# Patient Record
Sex: FEMALE | Race: WHITE
[De-identification: names, ages, dates, MRNs, and addresses within clinical notes are randomized per-mention and may not be internally consistent; named-entity substitution may affect disease eponyms.]

---

## 2018-04-14 NOTE — EDM.PDOC
ED HPI GENERAL MEDICAL PROBLEM





- General


Chief Complaint: Lower Extremity Injury/Pain


Stated Complaint: RIGHT LEG SWELLING-SENT BY CLINIC


Time Seen by Provider: 04/14/18 11:51


Source of Information: Reports: Patient


History Limitations: Reports: No Limitations





- History of Present Illness


INITIAL COMMENTS - FREE TEXT/NARRATIVE: 


Patient is a 63-year-old female who presents to the ED complaining of right 

lower leg discomfort with mild increased swelling, and mild erythema noted to 

the right superior portion of the medial calf. Pain does radiate to the back of 

the knee. There is slight redness and warmth noted by patient. States symptoms 

came on this morning about 8:00 after taking a shower. There was no discomfort 

noted with getting up from the bed and walking to the bathroom. She has no 

history of DVTs. She denies any recent trauma or activity that may precipitated 

this. Pain is worsened with ambulation and also palpation. She denies any 

history of cancer, hypercoaguable states, recent hospitalization, or surgeries. 

She did travel to [place] the end of February and was there for 10 days. She had 

no issues at that time. She has a history of anxiety and is on Lexapro. She has 

a history of gastric bypass and is also on B12. She is on no estrogen 

replacement therapies.








  ** Right Lower Leg


Pain Score (Numeric/FACES): 4





- Related Data


 Allergies











Allergy/AdvReac Type Severity Reaction Status Date / Time


 


No Known Allergies Allergy   Verified 04/14/18 11:10











Home Meds: 


 Home Meds





Escitalopram [Lexapro] 20 mg PO DAILY 10/27/15 [History]


Cyanocobalamin (Vitamin B-12) [Vitamin B12] 2,500 mcg PO DAILY 07/16/17 [History

]


Ergocalciferol (Vitamin D2) [Vitamin D2] 2,000 units PO DAILY 07/16/17 [History]


Ferrous Sulfate 325 mg PO DAILY 07/16/17 [History]


Omeprazole Magnesium [Prilosec Otc] 20 mg PO DAILY 07/16/17 [History]











Past Medical History


Gastrointestinal History: Reports: Other (See Below)


Genitourinary History: Reports: UTI, Recurrent


Other Musculoskeletal History: lower back pain, lumbar stenosis


Psychiatric History: Reports: Depression


Hematologic History: Reports: B12 Deficiency, Iron Deficiency





- Past Surgical History


GI Surgical History: Reports: Bariatric Procedure


Female  Surgical History: Reports: Hysterectomy





Social & Family History





- Family History


Family Medical History: Noncontributory





- Tobacco Use


Smoking Status *Q: Never Smoker


Second Hand Smoke Exposure: No





- Caffeine Use


Caffeine Use: Reports: Coffee





- Alcohol Use


Days Per Week of Alcohol Use: 0





- Recreational Drug Use


Recreational Drug Use: No





Review of Systems





- Review of Systems


Review Of Systems: See Below


Constitutional: Reports: No Symptoms


Respiratory: Reports: No Symptoms


Cardiovascular: Reports: No Symptoms


GI/Abdominal: Reports: No Symptoms


Neurological: Reports: No Symptoms





ED EXAM, GENERAL





- Physical Exam


Exam: See Below


Exam Limited By: No Limitations


General Appearance: Alert, WD/WN, No Apparent Distress


Ears: Hearing Grossly Normal


Nose: Normal Inspection


Throat/Mouth: Normal Voice, No Airway Compromise


Neck: Normal Inspection, Supple


Respiratory/Chest: No Respiratory Distress, Lungs Clear, Normal Breath Sounds, 

No Accessory Muscle Use


Cardiovascular: Normal Peripheral Pulses, Regular Rate, Rhythm, No Murmur


Peripheral Pulses: 3+: Posterior Tibial (L), Posterior Tibial (R), 4+: Radial (L

), Radial (R)


GI/Abdominal: Normal Bowel Sounds, Soft, Non-Tender, No Organomegaly, No 

Distention


Extremities: Other (Varicosities noted to the right lower leg with small red 

area to the superior aspect of the right calf medially. With palpation 

increaseing pain present.  No sensory deficits noted. Patient has full range of 

motion to the knee with redness streaking up her leg. No open wounds present. )


Neurological: Alert, Oriented, CN II-XII Intact, Normal Cognition, No Motor/

Sensory Deficits





Course





- Vital Signs


Last Recorded V/S: 


 Last Vital Signs











Temp  97.4 F   04/14/18 11:13


 


Pulse  63   04/14/18 11:13


 


Resp  12   04/14/18 11:13


 


BP  151/91 H  04/14/18 11:13


 


Pulse Ox  100   04/14/18 11:13














- Orders/Labs/Meds


Labs: 


 Laboratory Tests











  04/14/18 04/14/18 04/14/18 Range/Units





  12:10 12:10 12:10 


 


WBC  7.96    (3.98-10.04)  K/mm3


 


RBC  4.28    (3.98-5.22)  M/mm3


 


Hgb  12.6    (11.2-15.7)  gm/L


 


Hct  40.3    (34.1-44.9)  %


 


MCV  94.2    (79.4-94.8)  fl


 


MCH  29.4    (25.6-32.2)  pg


 


MCHC  31.3 L    (32.2-35.5)  g/dl


 


RDW Std Deviation  46.6 H    (36.4-46.3)  fL


 


Plt Count  291    (182-369)  K/mm3


 


MPV  10.9    (9.4-12.3)  fl


 


Neutrophils % (Manual)  59    (40-60)  %


 


Band Neutrophils %  0    (0-10)  %


 


Lymphocytes % (Manual)  34    (20-40)  %


 


Atypical Lymphs %  0    %


 


Monocytes % (Manual)  4    (2-10)  %


 


Eosinophils % (Manual)  3    (0.7-5.8)  %


 


Basophils % (Manual)  0 L    (0.1-1.2)  


 


Platelet Estimate  Adequate    


 


RBC Morph Comment  Normal    


 


PT    10.3  (9.5-12.1)  SECONDS


 


INR    0.94  


 


APTT    23 L  (24-31)  SECONDS


 


Sodium   143   (136-145)  mEq/L


 


Potassium   4.1   (3.5-5.1)  mEq/L


 


Chloride   107   ()  mEq/L


 


Carbon Dioxide   28   (21-32)  mEq/L


 


Anion Gap   12.1   (5-15)  


 


BUN   18   (7-18)  mg/dL


 


Creatinine   0.8   (0.55-1.02)  mg/dL


 


Est Cr Clr Drug Dosing   57.99   mL/min


 


Estimated GFR (MDRD)   > 60   (>60)  mL/min


 


BUN/Creatinine Ratio   22.5 H   (14-18)  


 


Glucose   90   ()  mg/dL


 


Calcium   8.7   (8.5-10.1)  mg/dL


 


Total Bilirubin   0.6   (0.2-1.0)  mg/dL


 


AST   21   (15-37)  U/L


 


ALT   25   (14-59)  U/L


 


Alkaline Phosphatase   89   ()  U/L


 


C-Reactive Protein   0.3   (<1.0)  mg/dL


 


Total Protein   6.4   (6.4-8.2)  g/dl


 


Albumin   3.4   (3.4-5.0)  g/dl


 


Globulin   3.0   gm/dL


 


Albumin/Globulin Ratio   1.1   (1-2)  














- Re-Assessments/Exams


Free Text/Narrative Re-Assessment/Exam: 











Ordered CBC, chem 14, CRP, and coag studies. In addition ordered we'll duplex 

lower right extremity venous to evaluate for DVT. Suspect patient has 

superficial thrombophlebitis with history of varicose vein.  Examination would 

be consistent for this as well.  Will rule out DVT.





04/14/18 Ultrasound impression: Superficial thrombophlebitis within the right. 

No findings of deep venous thrombosis seen within the right lower extremity or 

left common femoral vein. Labs reviewed with no concerning findings.





Will discharge patient home with instructions as documented.





The patient remained hemodynamically stable while under my care in the E.D. I 

reviewed the patients care today and discussed the concerning symptoms for 

which to returnto the E.D. with the patient/family. The patient/family 

verbalized understanding. All questions were answered. 








Departure





- Departure


Time of Disposition: 13:58


Disposition: Home, Self-Care 01


Condition: Good


Clinical Impression: 


Thrombophlebitis leg superficial


Qualifiers:


 Laterality: right Qualified Code(s): I80.01 - Phlebitis and thrombophlebitis 

of superficial vessels of right lower extremity








- Discharge Information


Instructions:  Phlebitis, Easy-to-Read


Referrals: 


Sara Covington PA-C [Primary Care Provider] - 


Forms:  ED Department Discharge


Additional Instructions: 


Take ibuprofen 400 mg 4 times a day. Utilize local heat to the affected area 4 

times a day. Will have you start wearing compression stockings for the next 2 

weeks. Please pick these compression stockings up on Monday from [employer]. 

Please followup with PCP the end of next week for reevaluation.  Return to the 

E.D. if you develop any new or worsening symptoms as discussed.

## 2018-04-14 NOTE — US
Right lower extremity deep venous ultrasound: Duplex and color flow 

imaging was obtained of the right common femoral, proximal greater 

saphenous, superficial femoral, popliteal, posterior tibial and 

peroneal veins.  Left common femoral vein was also evaluated.

 

Findings: Superficial vein within the right calf shows evidence of 

thrombus which is compatible with superficial thrombophlebitis.  Deep 

veins show normal phasic flow, augmentation and compression.

 

Impression:

1.  Superficial thrombophlebitis within the right calf.

2.  No findings of deep venous thrombosis seen within the right lower 

extremity or left common femoral vein.

 

Diagnostic code #3

## 2020-09-23 ENCOUNTER — HOSPITAL ENCOUNTER (EMERGENCY)
Dept: HOSPITAL 41 - JD.ED | Age: 65
Discharge: HOME | End: 2020-09-23
Payer: MEDICARE

## 2020-09-23 VITALS — SYSTOLIC BLOOD PRESSURE: 111 MMHG | HEART RATE: 64 BPM | DIASTOLIC BLOOD PRESSURE: 62 MMHG

## 2020-09-23 DIAGNOSIS — F32.9: ICD-10-CM

## 2020-09-23 DIAGNOSIS — Z79.899: ICD-10-CM

## 2020-09-23 DIAGNOSIS — U07.1: Primary | ICD-10-CM

## 2020-09-23 DIAGNOSIS — Z90.710: ICD-10-CM

## 2020-09-23 PROCEDURE — U0002 COVID-19 LAB TEST NON-CDC: HCPCS

## 2020-09-23 PROCEDURE — 84484 ASSAY OF TROPONIN QUANT: CPT

## 2020-09-23 PROCEDURE — 71045 X-RAY EXAM CHEST 1 VIEW: CPT

## 2020-09-23 PROCEDURE — 80053 COMPREHEN METABOLIC PANEL: CPT

## 2020-09-23 PROCEDURE — 99285 EMERGENCY DEPT VISIT HI MDM: CPT

## 2020-09-23 PROCEDURE — 96360 HYDRATION IV INFUSION INIT: CPT

## 2020-09-23 PROCEDURE — 85379 FIBRIN DEGRADATION QUANT: CPT

## 2020-09-23 PROCEDURE — 36415 COLL VENOUS BLD VENIPUNCTURE: CPT

## 2020-09-23 PROCEDURE — 85025 COMPLETE CBC W/AUTO DIFF WBC: CPT

## 2020-09-23 PROCEDURE — 86140 C-REACTIVE PROTEIN: CPT

## 2020-09-23 PROCEDURE — 93005 ELECTROCARDIOGRAM TRACING: CPT

## 2020-09-23 NOTE — CR
Chest: Portable view of the chest was obtained.

 

Comparison: No prior chest imaging is available.

 

Heart size and mediastinum are within normal limits for portable 

technique.  Slight areas of increased density are noted within both 

sides of the chest.  Lungs otherwise are clear.  Bony structures are 

grossly intact.

 

Impression:

1.  Possible minimal areas of increased density on both sides of the 

chest.  Findings could represent mild viral pneumonia.

2.  No additional abnormality is seen on portable chest x-ray.

 

Diagnostic code #3

 

This report was dictated in MDT

## 2020-09-23 NOTE — EDM.PDOC
ED HPI GENERAL MEDICAL PROBLEM





- General


Chief Complaint: Respiratory Problem


Stated Complaint: SOB/WEAK


Time Seen by Provider: 09/23/20 11:07


Source of Information: Reports: Patient


History Limitations: Reports: No Limitations





- History of Present Illness


INITIAL COMMENTS - FREE TEXT/NARRATIVE: 





Patient is a 65-year-old female presenting to the emergency department with a 

one-week history of feeling "under the weather ".  She describes feeling 

intermittently lightheaded, having congestion, clear nasal discharge, and 

postnasal drip, sore throat, and feeling like something is stuck in her throat. 

She states that she was seen at the walk-in clinic earlier this week and told 

that she is likely suffering from allergies, which she has had problems with in 

the past.  She has tried over-the-counter Claritin and Allegra which she states 

did not help much, however she does have some improvement with Flonase spray and

Mucinex.  She states this morning she had an episode where she felt short of 

breath.  This has since passed.  She denies any fever, chills, abdominal pain, 

nausea, vomiting, or diarrhea, however she states she has had a decreased 

appetite and thinks maybe she is dehydrated.


  ** Throat


Pain Score (Numeric/FACES): 4





- Related Data


                                    Allergies











Allergy/AdvReac Type Severity Reaction Status Date / Time


 


No Known Allergies Allergy   Verified 09/23/20 10:40











Home Meds: 


                                    Home Meds





Escitalopram [Lexapro] 20 mg PO DAILY 10/27/15 [History]


Cyanocobalamin (Vitamin B-12) [Vitamin B12] 2,500 mcg PO DAILY 07/16/17 

[History]


Ergocalciferol (Vitamin D2) [Vitamin D2] 2,000 units PO DAILY 07/16/17 [History]


Ferrous Sulfate 325 mg PO DAILY 07/16/17 [History]


Omeprazole Magnesium [Prilosec Otc] 20 mg PO DAILY 07/16/17 [History]


Calcium Carbonate [Calcium] 2,000 mg PO DAILY 09/23/20 [History]











Past Medical History


Gastrointestinal History: Reports: Other (See Below)


Genitourinary History: Reports: UTI, Recurrent


Other Musculoskeletal History: lower back pain, lumbar stenosis


Psychiatric History: Reports: Depression


Hematologic History: Reports: B12 Deficiency, Iron Deficiency





- Infectious Disease History


Infectious Disease History: Reports: Chicken Pox


Other Infectious Disease History: chicken pox as child.





- Past Surgical History


GI Surgical History: Reports: Bariatric Procedure


Female  Surgical History: Reports: Hysterectomy


Musculoskeletal Surgical History: Reports: Hip Replacement


Other Musculoskeletal Surgeries/Procedures:: left hip replacement





Social & Family History





- Family History


Family Medical History: Noncontributory





- Tobacco Use


Smoking Status *Q: Never Smoker





- Caffeine Use


Caffeine Use: Reports: Coffee





- Recreational Drug Use


Recreational Drug Use: No





ED ROS GENERAL





- Review of Systems


Review Of Systems: See Below


Constitutional: Reports: Fatigue, Decreased Appetite.  Denies: Fever, Chills


HEENT: Reports: Rhinitis, Sinus Problem, Throat Pain.  Denies: Vision Change


Respiratory: Reports: Shortness of Breath.  Denies: Cough


Cardiovascular: Reports: Lightheadedness.  Denies: Chest Pain, Dyspnea on 

Exertion


Endocrine: Reports: No Symptoms


GI/Abdominal: Reports: Decreased Appetite.  Denies: Abdominal Pain, Diarrhea, 

Nausea, Vomiting


: Reports: No Symptoms


Musculoskeletal: Reports: No Symptoms


Skin: Reports: No Symptoms


Neurological: Reports: Dizziness.  Denies: Headache, Trouble Speaking, Change in

 Speech, Gait Disturbance


Psychiatric: Reports: No Symptoms


Hematologic/Lymphatic: Reports: No Symptoms


Immunologic: Reports: No Symptoms





ED EXAM, GENERAL





- Physical Exam


Exam: See Below


General Appearance: Alert, WD/WN, No Apparent Distress


Eye Exam: Bilateral Eye: Normal Inspection, PERRL


Ears: Normal External Exam, Normal Canal, Hearing Grossly Normal, Other (Clear 

fluid behind bilateral TMs)


Throat/Mouth: Normal Inspection, Normal Lips, Normal Teeth, Normal Gums, Normal 

Oropharynx, Normal Voice, No Airway Compromise


Neck: Normal Inspection, Supple, Non-Tender, Full Range of Motion


Respiratory/Chest: No Respiratory Distress, Lungs Clear, Normal Breath Sounds, 

No Accessory Muscle Use, Chest Non-Tender


Cardiovascular: Normal Peripheral Pulses, Regular Rate, Rhythm, No Edema, No Gal

lop, No JVD, No Murmur, No Rub


GI/Abdominal: Normal Bowel Sounds, Soft, Non-Tender, No Organomegaly, No 

Distention, No Abnormal Bruit, No Mass


Neurological: Alert, Oriented, CN II-XII Intact, Normal Cognition, Normal Gait, 

Normal Reflexes, No Motor/Sensory Deficits


Psychiatric: Normal Affect, Normal Mood


Skin Exam: Warm, Dry, Intact, Normal Color, No Rash





EKG INTERPRETATION


EKG Date: 09/23/20


Time: 11:03


Rhythm: NSR


Rate (Beats/Min): 55


Axis: Normal ()


P-Wave: Present


QRS: Normal ()


ST-T: Normal


QT: Normal ()





Course





- Vital Signs


Last Recorded V/S: 


                                Last Vital Signs











Temp  98.2 F   09/23/20 10:34


 


Pulse  64   09/23/20 10:34


 


Resp  18   09/23/20 10:34


 


BP  111/62   09/23/20 10:34


 


Pulse Ox  94 L  09/23/20 10:34














- Orders/Labs/Meds


Orders: 


                               Active Orders 24 hr











 Category Date Time Status


 


 CORONAVIRUS COVID-19 PCR [facility] Routine Lab  09/23/20 12:25 Received











Labs: 


                                Laboratory Tests











  09/23/20 09/23/20 09/23/20 Range/Units





  10:48 10:48 10:48 


 


WBC  4.70    (3.98-10.04)  K/mm3


 


RBC  4.51    (3.98-5.22)  M/mm3


 


Hgb  13.4    (11.2-15.7)  gm/dl


 


Hct  42.9    (34.1-44.9)  %


 


MCV  95.1 H    (79.4-94.8)  fl


 


MCH  29.7    (25.6-32.2)  pg


 


MCHC  31.2 L    (32.2-35.5)  g/dl


 


RDW Std Deviation  46.3    (36.4-46.3)  fL


 


Plt Count  248    (182-369)  K/mm3


 


MPV  11.2    (9.4-12.3)  fl


 


Neut % (Auto)  64.8    (34.0-71.1)  %


 


Lymph % (Auto)  22.1    (19.3-51.7)  %


 


Mono % (Auto)  11.9    (4.7-12.5)  %


 


Eos % (Auto)  0.4 L    (0.7-5.8)  


 


Baso % (Auto)  0.4    (0.1-1.2)  %


 


Neut # (Auto)  3.04    (1.56-6.13)  K/mm3


 


Lymph # (Auto)  1.04 L    (1.18-3.74)  K/mm3


 


Mono # (Auto)  0.56 H    (0.24-0.36)  K/mm3


 


Eos # (Auto)  0.02 L    (0.04-0.36)  K/mm3


 


Baso # (Auto)  0.02    (0.01-0.08)  K/mm3


 


D-Dimer, Quantitative   0.42   (0.19-0.50)  mg/L


 


Sodium    139  (136-145)  mEq/L


 


Potassium    3.9  (3.5-5.1)  mEq/L


 


Chloride    102  ()  mEq/L


 


Carbon Dioxide    28  (21-32)  mEq/L


 


Anion Gap    12.9  (5-15)  


 


BUN    15  (7-18)  mg/dL


 


Creatinine    1.0  (0.55-1.02)  mg/dL


 


Est Cr Clr Drug Dosing    45.16  mL/min


 


Estimated GFR (MDRD)    55  (>60)  mL/min


 


BUN/Creatinine Ratio    15.0  (14-18)  


 


Glucose    76 L  ()  mg/dL


 


Calcium    8.4 L  (8.5-10.1)  mg/dL


 


Total Bilirubin    0.5  (0.2-1.0)  mg/dL


 


AST    33  (15-37)  U/L


 


ALT    27  (14-59)  U/L


 


Alkaline Phosphatase    66  ()  U/L


 


Troponin I    < 0.017  (0.00-0.056)  ng/mL


 


C-Reactive Protein    3.4 H*  (<1.0)  mg/dL


 


Total Protein    6.9  (6.4-8.2)  g/dl


 


Albumin    3.4  (3.4-5.0)  g/dl


 


Globulin    3.5  gm/dL


 


Albumin/Globulin Ratio    1.0  (1-2)  











Meds: 


Medications














Discontinued Medications














Generic Name Dose Route Start Last Admin





  Trade Name Freq  PRN Reason Stop Dose Admin


 


Sodium Chloride  1,000 mls @ 999 mls/hr  09/23/20 11:49  09/23/20 12:17





  Normal Saline  IV  09/23/20 12:49  999 mls/hr





  NOW STA   Administration














- Re-Assessments/Exams


Free Text/Narrative Re-Assessment/Exam: 


Patient is a 65-year-old female presenting to the emergency department with 

feeling under the weather.  She is had intermittent lightheadedness, increased 

fatigue, nasal congestion with postnasal drip, sore throat, and feeling like 

something is stuck in her throat ".  Today she had a brief episode of shortness 

of breath, however this has resolved.  My suspicion is that she is likely 

suffering from seasonal allergies, however I have ordered a CBC, CMP, CRP, d-

dimer, troponin, EKG, chest x-ray.  We will give her a 1 L bolus of normal 

saline IV.





09/23/20 13:07


Otology was grossly unremarkable.  D-dimer was negative, electrolytes were 

normal, anion gap was normal, troponin was negative, CRP minimally elevated at 

3.4.  There were no obvious visible infiltrates on chest x-ray, however official

 radiologist read is still pending.  Oxygen saturations have ranged from 93 to 

96% throughout her stay in the ER.  We will complete a state send out 

coronavirus test.  Recommend that she continue to use Flonase and Mucinex as 

needed.  Discharge instructions as documented.








Departure





- Departure


Time of Disposition: 13:18


Disposition: Home, Self-Care 01


Condition: Good


Clinical Impression: 


 Nasal congestion, Shortness of breath





Fatigue


Qualifiers:


 Fatigue type: unspecified Qualified Code(s): R53.83 - Other fatigue








- Discharge Information


*PRESCRIPTION DRUG MONITORING PROGRAM REVIEWED*: No


*COPY OF PRESCRIPTION DRUG MONITORING REPORT IN PATIENT GONZALO: No


Instructions:  Fatigue


Referrals: 


Sara Covington PA-C [Primary Care Provider] - 


Forms:  ED Department Discharge


Additional Instructions: 


You were seen in the emergency department today for intermittent episodes of 

lightheadedness, nasal congestion with postnasal drip, sore throat, shortness of

breath, and feeling of something stuck in your throat.  He work-up included 

blood work, chest x-ray, and EKG of your heart.  Your work-up was found to be 

overall normal.  As we discussed, it is very possible that your symptoms are 

caused by seasonal allergies, however you have also been tested for COVID today.

 These results generally take 24 to 72 hours.  You will be notified when these 

are complete.  Recommend that you continue to use your over-the-counter Flonase 

and Mucinex as needed.  Ensure that you are taking an adequate amount of fluid. 

Recommend isolation until your COVID results are available.  Return to the ER 

for any new or worsening symptoms of concern.





Sepsis Event Note (ED)





- Evaluation


Sepsis Screening Result: No Definite Risk





- My Orders


Last 24 Hours: 


My Active Orders





09/23/20 12:25


CORONAVIRUS COVID-19 PCR [facility] Routine 














- Assessment/Plan


Last 24 Hours: 


My Active Orders





09/23/20 12:25


CORONAVIRUS COVID-19 PCR [facility] Routine

## 2020-09-26 ENCOUNTER — HOSPITAL ENCOUNTER (EMERGENCY)
Dept: HOSPITAL 41 - JD.ED | Age: 65
Discharge: HOME | End: 2020-09-26
Payer: MEDICARE

## 2020-09-26 VITALS — SYSTOLIC BLOOD PRESSURE: 105 MMHG | DIASTOLIC BLOOD PRESSURE: 50 MMHG | HEART RATE: 53 BPM

## 2020-09-26 DIAGNOSIS — J12.89: ICD-10-CM

## 2020-09-26 DIAGNOSIS — F32.9: ICD-10-CM

## 2020-09-26 DIAGNOSIS — K21.9: ICD-10-CM

## 2020-09-26 DIAGNOSIS — U07.1: Primary | ICD-10-CM

## 2020-09-26 DIAGNOSIS — Z90.710: ICD-10-CM

## 2020-09-26 DIAGNOSIS — Z79.899: ICD-10-CM

## 2020-09-26 PROCEDURE — 85379 FIBRIN DEGRADATION QUANT: CPT

## 2020-09-26 PROCEDURE — 96374 THER/PROPH/DIAG INJ IV PUSH: CPT

## 2020-09-26 PROCEDURE — 83605 ASSAY OF LACTIC ACID: CPT

## 2020-09-26 PROCEDURE — 83880 ASSAY OF NATRIURETIC PEPTIDE: CPT

## 2020-09-26 PROCEDURE — 71275 CT ANGIOGRAPHY CHEST: CPT

## 2020-09-26 PROCEDURE — 83735 ASSAY OF MAGNESIUM: CPT

## 2020-09-26 PROCEDURE — 85610 PROTHROMBIN TIME: CPT

## 2020-09-26 PROCEDURE — 84484 ASSAY OF TROPONIN QUANT: CPT

## 2020-09-26 PROCEDURE — 96361 HYDRATE IV INFUSION ADD-ON: CPT

## 2020-09-26 PROCEDURE — 85730 THROMBOPLASTIN TIME PARTIAL: CPT

## 2020-09-26 PROCEDURE — 82553 CREATINE MB FRACTION: CPT

## 2020-09-26 PROCEDURE — 80053 COMPREHEN METABOLIC PANEL: CPT

## 2020-09-26 PROCEDURE — 86140 C-REACTIVE PROTEIN: CPT

## 2020-09-26 PROCEDURE — 36415 COLL VENOUS BLD VENIPUNCTURE: CPT

## 2020-09-26 PROCEDURE — 83615 LACTATE (LD) (LDH) ENZYME: CPT

## 2020-09-26 PROCEDURE — 99285 EMERGENCY DEPT VISIT HI MDM: CPT

## 2020-09-26 PROCEDURE — 82728 ASSAY OF FERRITIN: CPT

## 2020-09-26 PROCEDURE — 71045 X-RAY EXAM CHEST 1 VIEW: CPT

## 2020-09-26 PROCEDURE — 85025 COMPLETE CBC W/AUTO DIFF WBC: CPT

## 2020-09-26 PROCEDURE — 93005 ELECTROCARDIOGRAM TRACING: CPT

## 2020-09-26 NOTE — CR
Chest: Portable view of the chest was obtained.

 

Comparison: Prior chest x-ray of 09/23/20.

 

Increased diffuse parenchymal change is noted within both lungs as an 

interval change from previous study.  Findings are compatible with 

diffuse Covid pneumonia.

 

Heart size and mediastinum are normal.  Bony structures are grossly 

intact.

 

Impression:

1.  Worsening appearance of the chest from previous exam which is 

compatible with worsening Covid pneumonia.

2.  Other portions of the chest are stable.

 

Diagnostic code #5

 

This report was dictated in MDT

## 2020-09-26 NOTE — EDM.PDOC
ED HPI GENERAL MEDICAL PROBLEM





- General


Chief Complaint: Chest Pain


Stated Complaint: COVID +/SOB


Time Seen by Provider: 09/26/20 09:00


Source of Information: Reports: Patient


History Limitations: Reports: No Limitations





- History of Present Illness


INITIAL COMMENTS - FREE TEXT/NARRATIVE: 





65-year-old female presents to the ED feeling lightheaded dizzy and short of 

breath.  She has been under the weather for 8 days starting last Saturday, September 19.  Paroxysmal cough minimally productive of white sputum.  She was 

seen through the ED on the 23rd of this month and COVID screen was carried out. 

She returned COVID positive from [facility] yesterday.  Decreased appetite 

for the last 3 days.  Had 2 loose stools yesterday.  No nausea or vomiting.  

Generalized weakness generalized myalgia.  Fever and  chills intermittently.  

Does feel heaviness central chest and difficulty to get a full breath of air.


Onset: Gradual


Onset Date: 09/19/20


Duration: Day(s):, Getting Worse


Location: Reports: Chest (Pressure in the upper chest and difficult to get a 

full deep breath of air.  Cough occasionally productive of white sputum.), 

Generalized (Generalized weakness.  Generalized myalgia.), Other (Intermittent 

fever chills.  Marked anorexia.)


Quality: Reports: Ache


Severity: Moderate (Generalized myalgia)


Improves with: Reports: Medication


Worsens with: Reports: Movement


Context: Reports: Other (Diagnosed with COVID-19 illness yesterday through 

public health.).  Denies: Activity, Exercise (Has been mostly bedridden the last

3 days.), Lifting, Sick Contact, Trauma


Associated Symptoms: Reports: Chest Pain (Heaviness), Cough ( mostly upper 

anterior chest.), cough w sputum, Fever/Chills, Headaches, Loss of Appetite, 

Malaise, Shortness of Breath, Other (Mild diarrhea yesterday.).  Denies: 

Confusion, Diaphoresis (Occasional white sputum.), Nausea/Vomiting


Treatments PTA: Reports: Acetaminophen


  ** Chest


Pain Score (Numeric/FACES): 6





- Related Data


                                    Allergies











Allergy/AdvReac Type Severity Reaction Status Date / Time


 


No Known Allergies Allergy   Verified 09/26/20 08:57











Home Meds: 


                                    Home Meds





Escitalopram [Lexapro] 20 mg PO DAILY 10/27/15 [History]


Cyanocobalamin (Vitamin B-12) [Vitamin B12] 2,500 mcg PO DAILY 07/16/17 

[History]


Ergocalciferol (Vitamin D2) [Vitamin D2] 2,000 units PO DAILY 07/16/17 [History]


Ferrous Sulfate 325 mg PO DAILY 07/16/17 [History]


Omeprazole Magnesium [Prilosec Otc] 20 mg PO DAILY 07/16/17 [History]


Calcium Carbonate [Calcium] 2,000 mg PO DAILY 09/23/20 [History]


dexAMETHasone [Dexamethasone] 8 mg PO BID #10 tablet 09/26/20 [Rx]











Past Medical History


Gastrointestinal History: Reports: GERD, Hiatal Hernia (Hiatal hernia post 

gastric bypass surgery.), Other (See Below)


Genitourinary History: Reports: UTI, Recurrent


Other Musculoskeletal History: lower back pain, lumbar stenosis


Psychiatric History: Reports: Depression


Hematologic History: Reports: B12 Deficiency, Iron Deficiency





- Infectious Disease History


Infectious Disease History: Reports: Chicken Pox


Other Infectious Disease History: chicken pox as child.





- Past Surgical History


GI Surgical History: Reports: Bariatric Procedure (Gastric bypass.)


Female  Surgical History: Reports: Hysterectomy


Musculoskeletal Surgical History: Reports: Hip Replacement


Other Musculoskeletal Surgeries/Procedures:: left hip replacement





Social & Family History





- Family History


Family Medical History: Noncontributory





- Caffeine Use


Caffeine Use: Reports: Coffee





- Living Situation & Occupation


Living situation: Reports: 


Occupation: Retired





ED ROS GENERAL





- Review of Systems


Review Of Systems: See Below


Constitutional: Reports: Fever, Chills, Malaise, Weakness, Fatigue, Decreased 

Appetite


HEENT: Reports: Other


Respiratory: Reports: Shortness of Breath, Cough, Sputum.  Denies: Wheezing, 

Pleuritic Chest Pain, Hemoptysis (Occasional white sputum production)


Cardiovascular: Reports: Chest Pain (Upper chest discomfort.), Blood Pressure 

Problem, Dyspnea on Exertion, Lightheadedness (Dizziness with standing.).  

Denies: Claudication (Running a low at this time.), Edema, Orthopnea, 

Palpitations


Endocrine: Reports: Fatigue


GI/Abdominal: Reports: Anorexia (Still taking some water and Gatorade.  No 

appetite at all), Diarrhea (Had 3 loose bowel movements yesterday but none 

since.)


: Reports: No Symptoms


Musculoskeletal: Reports: Muscle Pain (Generalized myalgia.)


Skin: Reports: No Symptoms


Neurological: Reports: Dizziness, Headache, Difficulty Walking (Due to the 

weakness.), Weakness.  Denies: Confusion, Numbness, Syncope, Tingling


Psychiatric: Reports: No Symptoms


Hematologic/Lymphatic: Reports: No Symptoms


Immunologic: Reports: No Symptoms





ED EXAM, GENERAL





- Physical Exam


Exam: See Below


Exam Limited By: No Limitations


General Appearance: Alert, WD/WN, No Apparent Distress, Other (Temperature is 

36.6.  Heart rate is 53 and sinus bradycardia on the monitor respiratory was 12-

16 with O2 sats 93 to 95% room air /50.  Initially.  After that it came 

down to 93/47.)


Eye Exam: Bilateral Eye: Normal Inspection (No scleral icterus or blepharal 

pallor.), PERRL


Ears: Normal TMs


Throat/Mouth: Normal Inspection, Normal Oropharynx (Tongue is mildly dry and 

coated.), Other


Head: Atraumatic, Normocephalic


Neck: Normal Inspection, Supple, Non-Tender, Full Range of Motion.  No: Carotid 

Bruit, Lymphadenopathy (L), Lymphadenopathy (R)


Respiratory/Chest: No Respiratory Distress, No Accessory Muscle Use, Crackles.  

No: Respiratory Distress, Rales, Rhonchi, Wheezing


Cardiovascular: Normal Peripheral Pulses, Regular Rate, Rhythm, No Edema, No 

Gallop, No Murmur


Peripheral Pulses: 2+: Posterior Tibial (L), Posterior Tibial (R), Dorsalis 

Pedis (L), Dorsalis Pedis (R), 3+: Carotid (L), Carotid (R)


GI/Abdominal: Normal Bowel Sounds, Soft, Non-Tender, No Organomegaly, No Mass, 

Pelvis Stable, Other (Previous surgical scars from bariatric surgery and 

hysterectomy.)


Back Exam: Normal Inspection, Full Range of Motion.  No: CVA Tenderness (L), CVA

 Tenderness (R)


Extremities: Normal Inspection, Normal Range of Motion, Non-Tender, No Pedal 

Edema


Neurological: Alert, Oriented, CN II-XII Intact, Normal Cognition


Psychiatric: Normal Affect, Normal Mood


Skin Exam: Warm, Dry, Intact, Normal Color, No Rash





EKG INTERPRETATION


EKG Date: 09/26/20


Time: 09:25


Rhythm: Other


Rate (Beats/Min): 54


Axis: Normal


P-Wave: Enlarged (Left atrial hypertrophy pattern)


QRS: Normal


ST-T: Normal


QT: Prolonged (Mildly prolonged)


EKG Interpretation Comments: 





Borderline ECG





Course





- Vital Signs


Last Recorded V/S: 


                                Last Vital Signs











Temp  36.6 C   09/26/20 08:45


 


Pulse  53 L  09/26/20 08:45


 


Resp  12   09/26/20 08:45


 


BP  105/50 L  09/26/20 08:45


 


Pulse Ox  95   09/26/20 08:45














- Orders/Labs/Meds


Labs: 


                                Laboratory Tests











  09/26/20 09/26/20 09/26/20 Range/Units





  09:45 09:45 09:45 


 


WBC  6.34    (3.98-10.04)  K/mm3


 


RBC  4.54    (3.98-5.22)  M/mm3


 


Hgb  13.6    (11.2-15.7)  gm/dl


 


Hct  42.6    (34.1-44.9)  %


 


MCV  93.8    (79.4-94.8)  fl


 


MCH  30.0    (25.6-32.2)  pg


 


MCHC  31.9 L    (32.2-35.5)  g/dl


 


RDW Std Deviation  44.6    (36.4-46.3)  fL


 


Plt Count  313    (182-369)  K/mm3


 


MPV  11.0    (9.4-12.3)  fl


 


Neut % (Auto)  80.9 H    (34.0-71.1)  %


 


Lymph % (Auto)  9.9 L    (19.3-51.7)  %


 


Mono % (Auto)  7.1    (4.7-12.5)  %


 


Eos % (Auto)  1.4    (0.7-5.8)  


 


Baso % (Auto)  0.5    (0.1-1.2)  %


 


Neut # (Auto)  5.13    (1.56-6.13)  K/mm3


 


Lymph # (Auto)  0.63 L    (1.18-3.74)  K/mm3


 


Mono # (Auto)  0.45 H    (0.24-0.36)  K/mm3


 


Eos # (Auto)  0.09    (0.04-0.36)  K/mm3


 


Baso # (Auto)  0.03    (0.01-0.08)  K/mm3


 


Manual Slide Review  Abnormal smear    


 


PT   10.8   (9.7-11.7)  SECONDS


 


INR   1.01   


 


APTT   27   (22-31)  SECONDS


 


D-Dimer, Quantitative     (0.19-0.50)  mg/L


 


Sodium    137  (136-145)  mEq/L


 


Potassium    3.9  (3.5-5.1)  mEq/L


 


Chloride    99  ()  mEq/L


 


Carbon Dioxide    28  (21-32)  mEq/L


 


Anion Gap    13.9  (5-15)  


 


BUN    14  (7-18)  mg/dL


 


Creatinine    0.8  (0.55-1.02)  mg/dL


 


Est Cr Clr Drug Dosing    56.45  mL/min


 


Estimated GFR (MDRD)    > 60  (>60)  mL/min


 


BUN/Creatinine Ratio    17.5  (14-18)  


 


Glucose    98  ()  mg/dL


 


Lactic Acid     (0.4-2.0)  mmol/L


 


Calcium    8.4 L  (8.5-10.1)  mg/dL


 


Magnesium    2.2  (1.8-2.4)  mg/dl


 


Ferritin     (8-252)  ng/ml


 


Total Bilirubin    0.7  (0.2-1.0)  mg/dL


 


AST    35  (15-37)  U/L


 


ALT    28  (14-59)  U/L


 


Alkaline Phosphatase    68  ()  U/L


 


Lactate Dehydrogenase    325 H  ()  U/L


 


CK-MB (CK-2)    1.1  (0-3.6)  ng/ml


 


Troponin I    < 0.017  (0.00-0.056)  ng/mL


 


C-Reactive Protein    10.2 H*  (<1.0)  mg/dL


 


NT-Pro-B Natriuret Pep     (0-125)  pg/mL


 


Total Protein    6.9  (6.4-8.2)  g/dl


 


Albumin    3.1 L  (3.4-5.0)  g/dl


 


Globulin    3.8  gm/dL


 


Albumin/Globulin Ratio    0.8 L  (1-2)  














  09/26/20 09/26/20 09/26/20 Range/Units





  09:45 09:45 09:45 


 


WBC     (3.98-10.04)  K/mm3


 


RBC     (3.98-5.22)  M/mm3


 


Hgb     (11.2-15.7)  gm/dl


 


Hct     (34.1-44.9)  %


 


MCV     (79.4-94.8)  fl


 


MCH     (25.6-32.2)  pg


 


MCHC     (32.2-35.5)  g/dl


 


RDW Std Deviation     (36.4-46.3)  fL


 


Plt Count     (182-369)  K/mm3


 


MPV     (9.4-12.3)  fl


 


Neut % (Auto)     (34.0-71.1)  %


 


Lymph % (Auto)     (19.3-51.7)  %


 


Mono % (Auto)     (4.7-12.5)  %


 


Eos % (Auto)     (0.7-5.8)  


 


Baso % (Auto)     (0.1-1.2)  %


 


Neut # (Auto)     (1.56-6.13)  K/mm3


 


Lymph # (Auto)     (1.18-3.74)  K/mm3


 


Mono # (Auto)     (0.24-0.36)  K/mm3


 


Eos # (Auto)     (0.04-0.36)  K/mm3


 


Baso # (Auto)     (0.01-0.08)  K/mm3


 


Manual Slide Review     


 


PT     (9.7-11.7)  SECONDS


 


INR     


 


APTT     (22-31)  SECONDS


 


D-Dimer, Quantitative    0.76 H  (0.19-0.50)  mg/L


 


Sodium     (136-145)  mEq/L


 


Potassium     (3.5-5.1)  mEq/L


 


Chloride     ()  mEq/L


 


Carbon Dioxide     (21-32)  mEq/L


 


Anion Gap     (5-15)  


 


BUN     (7-18)  mg/dL


 


Creatinine     (0.55-1.02)  mg/dL


 


Est Cr Clr Drug Dosing     mL/min


 


Estimated GFR (MDRD)     (>60)  mL/min


 


BUN/Creatinine Ratio     (14-18)  


 


Glucose     ()  mg/dL


 


Lactic Acid     (0.4-2.0)  mmol/L


 


Calcium     (8.5-10.1)  mg/dL


 


Magnesium     (1.8-2.4)  mg/dl


 


Ferritin   1495 H   (8-252)  ng/ml


 


Total Bilirubin     (0.2-1.0)  mg/dL


 


AST     (15-37)  U/L


 


ALT     (14-59)  U/L


 


Alkaline Phosphatase     ()  U/L


 


Lactate Dehydrogenase     ()  U/L


 


CK-MB (CK-2)     (0-3.6)  ng/ml


 


Troponin I     (0.00-0.056)  ng/mL


 


C-Reactive Protein     (<1.0)  mg/dL


 


NT-Pro-B Natriuret Pep  513 H    (0-125)  pg/mL


 


Total Protein     (6.4-8.2)  g/dl


 


Albumin     (3.4-5.0)  g/dl


 


Globulin     gm/dL


 


Albumin/Globulin Ratio     (1-2)  














  09/26/20 Range/Units





  09:45 


 


WBC   (3.98-10.04)  K/mm3


 


RBC   (3.98-5.22)  M/mm3


 


Hgb   (11.2-15.7)  gm/dl


 


Hct   (34.1-44.9)  %


 


MCV   (79.4-94.8)  fl


 


MCH   (25.6-32.2)  pg


 


MCHC   (32.2-35.5)  g/dl


 


RDW Std Deviation   (36.4-46.3)  fL


 


Plt Count   (182-369)  K/mm3


 


MPV   (9.4-12.3)  fl


 


Neut % (Auto)   (34.0-71.1)  %


 


Lymph % (Auto)   (19.3-51.7)  %


 


Mono % (Auto)   (4.7-12.5)  %


 


Eos % (Auto)   (0.7-5.8)  


 


Baso % (Auto)   (0.1-1.2)  %


 


Neut # (Auto)   (1.56-6.13)  K/mm3


 


Lymph # (Auto)   (1.18-3.74)  K/mm3


 


Mono # (Auto)   (0.24-0.36)  K/mm3


 


Eos # (Auto)   (0.04-0.36)  K/mm3


 


Baso # (Auto)   (0.01-0.08)  K/mm3


 


Manual Slide Review   


 


PT   (9.7-11.7)  SECONDS


 


INR   


 


APTT   (22-31)  SECONDS


 


D-Dimer, Quantitative   (0.19-0.50)  mg/L


 


Sodium   (136-145)  mEq/L


 


Potassium   (3.5-5.1)  mEq/L


 


Chloride   ()  mEq/L


 


Carbon Dioxide   (21-32)  mEq/L


 


Anion Gap   (5-15)  


 


BUN   (7-18)  mg/dL


 


Creatinine   (0.55-1.02)  mg/dL


 


Est Cr Clr Drug Dosing   mL/min


 


Estimated GFR (MDRD)   (>60)  mL/min


 


BUN/Creatinine Ratio   (14-18)  


 


Glucose   ()  mg/dL


 


Lactic Acid  1.2  (0.4-2.0)  mmol/L


 


Calcium   (8.5-10.1)  mg/dL


 


Magnesium   (1.8-2.4)  mg/dl


 


Ferritin   (8-252)  ng/ml


 


Total Bilirubin   (0.2-1.0)  mg/dL


 


AST   (15-37)  U/L


 


ALT   (14-59)  U/L


 


Alkaline Phosphatase   ()  U/L


 


Lactate Dehydrogenase   ()  U/L


 


CK-MB (CK-2)   (0-3.6)  ng/ml


 


Troponin I   (0.00-0.056)  ng/mL


 


C-Reactive Protein   (<1.0)  mg/dL


 


NT-Pro-B Natriuret Pep   (0-125)  pg/mL


 


Total Protein   (6.4-8.2)  g/dl


 


Albumin   (3.4-5.0)  g/dl


 


Globulin   gm/dL


 


Albumin/Globulin Ratio   (1-2)  











Meds: 


Medications














Discontinued Medications














Generic Name Dose Route Start Last Admin





  Trade Name Freq  PRN Reason Stop Dose Admin


 


Dexamethasone  4 mg  09/26/20 13:40  09/26/20 14:11





  Dexamethasone  IVPUSH  09/26/20 13:41  4 mg





  ONETIME ONE   Administration


 


Dextrose/Lactated Ringer's  1,000 mls @ 999 mls/hr  09/26/20 09:15  09/26/20 

09:48





  Dextrose 5%-Lactated Ringers  IV   999 mls/hr





  ASDIRECTED VIRGIL   Administration


 


Sodium Chloride  100 mls @ 75 mls/hr  09/26/20 12:15  09/26/20 12:38





  Normal Saline  IV   75 mls/hr





  ASDIRECTED VIRGIL   Administration


 


Iopamidol  100 ml  09/26/20 12:01  09/26/20 12:36





  Isovue-370 (76%)  IVPUSH  09/26/20 12:02  100 ml





  ONETIME ONE   Administration


 


Sodium Chloride  10 ml  09/26/20 12:01  09/26/20 12:36





  Saline Flush  FLUSH   10 ml





  ONETIME PRN   Administration





  IV FLUSH  














- Radiology Interpretation


Free Text/Narrative:: 


65-year-old female presents to the ED with 8 days of illness.  Intermittent 

fever chills.  Generalized myalgia.  Loss of appetite.  She believes she is lost

 her sense of taste.  Increased shortness of breath over the last few days.  

Diagnosed with COVID positive illness yesterday through public health system.  

She was seen through the ED on 23 September and COVID screening was carried out 

at that time.  On examination blood pressure is low at 93/47.  She is not 

tachypneic and lungs for the most part are clear with a few crackles in both 

bases.  Chest x-ray reviewed from 3 days ago revealed perhaps a very early base 

basilar infiltrates bilaterally with suspicion for early viral pneumonia.  At 

this time patient appears to be mainly volume depleted.  IV will be D5 LR at 

open.  Not febrile at this time.  Complaining of some upper chest discomfort and

 difficulty getting her breath.  O2 sats are 93 to 95% at rest.  Plan she will 

have routine labs performed plus serum ferritin, LDH and troponin and d-dimer 

assays.








- Re-Assessments/Exams


Free Text/Narrative Re-Assessment/Exam: 





09/26/20 10:25 chest x-ray done portable reveals diffuse infiltrates in all 

lobes of the lungs compatible with viral pneumonia.  Cardiac silhouette is 

normal.  Bony structures are intact.  02 sats are staying 95 to 98% on room air.





09/26/20 10:26 White count is 6.34 with 80.9% neutrophils.  Hemoglobin 13.6 with

 hematocrit of 42.6.  Platelet count 313,000





09/26/20 11:03  Lactic acid 1.2.





09/26/20 11:20 PT is 10.8 with an INR of 1.01.  PTT is 27.  D-dimer is mildly 

elevated at 0.76.  Sodium 137 with potassium 3.9 chloride 99 with a bicarb of 

28.  Anion gap is 13.9.  BUN is 14 with a creatinine of 0.8.  GFR is greater 

than 60.  Glucose is 98.  Calcium slightly low at 8.4 due to not eating.  

Magnesium 2.2.  Serum ferritin markedly elevated at 1495.  Liver function 

normal.  Lactic dehydrogenase is elevated at 325.  CK-MB fraction 1.1 with a 

troponin I of less than 0.017.  C-reactive protein elevated at 10.2 BNP is 

elevated at 513.  Protein is 6.9 with an albumin fraction of 3.1.  Due to the 

elevated BNP I am going to give her Lasix 40 mg IV.  This should improve her 

hypoxemia which is not that bad on average she has been 95% on room air.  I will

 proceed with a CT pulmonary angiogram however due to the elevated d-dimer and 

BNP.  Unlikely to have PE since the troponin is normal.  A PE  should cause  

significant stress on the right side of the heart and elevation of cardiac 

markers.





09/26/20 11:41 Review of the patient's blood pressure remains on the lower side 

at 86/90 in spite of a liter of IV fluids.  I am going to proceed with further 

fluids D5 LR at 150 mils per hour.  Because of the low blood pressure elevated 

d-dimer and elevated BNP I am going to proceed with CT pulmonary angiogram.  

Patient has been advised of the reasoning for this.





09/26/20 13:20 Pulmonary angiogram reveals pulmonary arteries to be well 

opacified.  No filling defects are seen to indicate pulmonary embolism.  Aorta 

shows no aneurysm.  Scattered mediastinal and right hilar lymph nodes are seen. 

 Right hilar lymph nodes are slightly prominent most likely reactive to current 

illness.  No pericardial effusions are seen.  Previous bariatric surgery is 

appreciated small gallstones are seen within the gallbladder.  There is 

extensive patchy areas of increased density throughout both lungs involving 

upper and lower lobes.  This correlates with chest x-ray and COVID-19 illness.  

No pleural effusions are seen.  Bony structures show nothing acute.  Scattered 

degenerative changes are noted within the thoracic spine.





09/26/20 13:40 She seems to be doing better.  Sats are staying around 95% on 

room air.  Blood pressure is now 95/50.  Plan will be to give her a dose of 

steroids-- dexamethasone 4 mg IV push.  I will then discharge her to home.  Her 

 is not feeling well either and likely has COVID illness by history.   

She has no idea where she picked up this illness as she is been very cautious 

out in public, wearing mask etc.  I am going to place her on dexamethasone 8 mg 

twice daily for the next 3 days to help reduce inflammation.  She will of course

 return if she could becomes more short of breath or dyspneic.  Continue Tylenol

 as needed for fever relief.  Diet as able.

















Departure





- Departure


Time of Disposition: 14:15


Disposition: Home, Self-Care 01


Reason for Transfer *Q: Other


Condition: Fair


Clinical Impression: 


 COVID-19 determined by clinical diagnostic criteria, Viral pneumonia





Prescriptions: 


dexAMETHasone [Dexamethasone] 8 mg PO BID #10 tablet


Instructions:  COVID-19 Frequently Asked Questions, Viral Respiratory Infection,

 Easy-To-Read, Prevent the Spread of COVID-19 if You Are Sick - [facility]


Referrals: 


Sara Covington PA-C [Primary Care Provider] - 


Forms:  ED Department Discharge


Additional Instructions: 


Evaluation in the emergency room today in regards to COVID-19 illness with r

educed oxygen saturations i.e. 95% for the most part on room air and at rest.  

Chest x-ray reveals infiltrates throughout all lung fields compatible with viral

pneumonia.  CT scan of the chest was carried out due to elevated d-dimer at 0.76

which shows only mildly elevated normals up to 0.56.  However you had an 

associated low blood pressure and CT scan was done to rule out any blood clots 

in the lung.  No blood clots were found.  CT of course reveals infiltrates 

through all lung fields related to the COVID pneumonia.  Blood pressure remains 

lower than normal.  You were given a liter of fluids while in the ED to provide 

hydration.  Initial dose of dexamethasone 4 mg was given intravenously in the 

ER.  Ideally I would like you to take 2 4 mg tablets twice daily for the next 3 

days with the first dose due tonight at bedtime.  Continue Tylenol 650 mg every 

4 hours as needed for fever relief.  Motrin 600 mg every 6 hours if needed for 

fever relief.  Plenty of fluids and diet as tolerated.  Return to medical care 

if you continue to feel worse over the next 48 hours and are not improved with 

steroids orally.





Sepsis Event Note (ED)





- Focused Exam


Vital Signs: 


                                   Vital Signs











  Temp Pulse Resp BP Pulse Ox


 


 09/26/20 08:45  36.6 C  53 L  12  105/50 L  95

## 2020-09-26 NOTE — CT
CT chest

 

Technique: Multiple axial sections were obtained from above the lung 

apices inferiorly through the lung bases.  Intravenous contrast was 

utilized.  Study performed as a pulmonary angiogram protocol.

 

Findings: Pulmonary arteries are well opacified.  No filling defects 

are seen to indicate pulmonary embolism.  Aorta shows no aneurysm.  

Scattered mediastinal and right hilar lymph nodes are seen.  Right 

hilar lymph nodes are slightly prominent most likely reactive.  No 

pericardial effusions are seen.  Previous stomach surgery is noted.  

Small gallstones are seen within the gallbladder.

 

Patchy areas of increased density throughout both lungs are seen 

involving both upper and lower lungs.

 

No pleural effusions are seen.  Bony structures show nothing acute.  

Scattered degenerative change is noted within the spine.

 

Impression:

1.  No findings of pulmonary embolism.

2.  Diffuse parenchymal change throughout both sides of the chest.  

Given the diffuse nature of these findings, findings etiology due to 

viral pneumonia.  Please correlate if patient is Covid positive.

3.  Small gallstones within the gallbladder.

4.  Other findings believed to be incidental as described above.

 

Diagnostic code #5

 

This report was dictated in MDT

## 2020-11-23 ENCOUNTER — HOSPITAL ENCOUNTER (OUTPATIENT)
Dept: HOSPITAL 41 - JD.SDS | Age: 65
Discharge: HOME | End: 2020-11-23
Attending: ORTHOPAEDIC SURGERY
Payer: MEDICARE

## 2020-11-23 VITALS — HEART RATE: 50 BPM | DIASTOLIC BLOOD PRESSURE: 65 MMHG | SYSTOLIC BLOOD PRESSURE: 118 MMHG

## 2020-11-23 DIAGNOSIS — E53.8: ICD-10-CM

## 2020-11-23 DIAGNOSIS — F32.9: ICD-10-CM

## 2020-11-23 DIAGNOSIS — F41.9: ICD-10-CM

## 2020-11-23 DIAGNOSIS — Z98.890: ICD-10-CM

## 2020-11-23 DIAGNOSIS — M96.1: ICD-10-CM

## 2020-11-23 DIAGNOSIS — E66.9: ICD-10-CM

## 2020-11-23 DIAGNOSIS — M19.072: Primary | ICD-10-CM

## 2020-11-23 DIAGNOSIS — Z79.899: ICD-10-CM

## 2020-11-23 DIAGNOSIS — K21.9: ICD-10-CM

## 2020-11-23 DIAGNOSIS — G47.30: ICD-10-CM

## 2020-11-23 DIAGNOSIS — D50.9: ICD-10-CM

## 2020-11-23 PROCEDURE — C1713 ANCHOR/SCREW BN/BN,TIS/BN: HCPCS

## 2020-11-23 NOTE — [id number]
Post Anesthesia Note





- EVALUATION WITHIN 48HRS OF ANESTHETIC


Vital Signs in Normal Range: Yes


Patient Participated in Evaluation: Yes


Respiratory Function Stable: Yes


Airway Patent: Yes


Cardiovascular Function Stable: Yes


Hydration Status Stable: Yes


Pain Control Satisfactory: Yes


Nausea and Vomiting Control Satisfactory: Yes


Mental Status Recovered: Yes


Vital Signs: 


                                Last Vital Signs











Temp  37.1 C   11/23/20 10:50


 


Pulse  61   11/23/20 10:50


 


Resp  16   11/23/20 10:50


 


BP  143/57 H  11/23/20 10:50


 


Pulse Ox  98   11/23/20 10:50

## 2020-11-23 NOTE — PCM.PREANE
Preanesthetic Assessment





- Anesthesia/Transfusion/Family Hx


Anesthesia History: Prior Anesthesia Without Reaction


Family History of Anesthesia Reaction: No


Transfusion History: No Prior Transfusion(s)





- Review of Systems


General: No Symptoms


Pulmonary: No Symptoms


Cardiovascular: No Symptoms


Gastrointestinal: No Symptoms


Neurological: No Symptoms


Other: Reports: None





- Physical Assessment


NPO Status Date: 11/22/20


NPO Status Time: 21:00


ASA Class: 2


Mental Status: Alert & Oriented x3


Airway Class: Mallampati = 1


Dentition: Reports: Normal Dentition


Thyro-Mental Finger Breadths: 3


Mouth Opening Finger Breadths: 3


ROM/Head Extension: Full


Lungs: Clear to Auscultation, Normal Respiratory Effort


Cardiovascular: Regular Rate, Regular Rhythm





- Lab


Values: 





                             Laboratory Last Values











MRSA (PCR)  Negative   11/17/20  14:31    














- Allergies


Allergies/Adverse Reactions: 


                                    Allergies











Allergy/AdvReac Type Severity Reaction Status Date / Time


 


No Known Allergies Allergy   Verified 09/26/20 08:57














- Acknowledgements


Anesthesia Type Planned: Regional Block, MAC


Pt an Appropriate Candidate for the Planned Anesthesia: Yes


Alternatives and Risks of Anesthesia Discussed w Pt/Guardian: Yes


Pt/Guardian Understands and Agrees with Anesthesia Plan: Yes





PreAnesthesia Questionnaire


HEENT History: Reports: Impaired Vision


Other HEENT History: wears eyeglasses.


Cardiovascular History: Reports: None


Respiratory History: Reports: None


Other Respiratory History: seasonal allergies.


Gastrointestinal History: Reports: GERD, Hiatal Hernia (Hiatal hernia post 

gastric bypass surgery.), Other (See Below)


Genitourinary History: Reports: UTI, Recurrent


OB/GYN History: Reports: Pregnancy


Musculoskeletal History: Reports: Back Pain, Chronic


Other Musculoskeletal History: lower back pain, lumbar stenosis


Psychiatric History: Reports: Depression


Hematologic History: Reports: B12 Deficiency, Iron Deficiency





- Infectious Disease History


Infectious Disease History: Reports: Chicken Pox


Other Infectious Disease History: chicken pox as child.





- Past Surgical History


GI Surgical History: Reports: Bariatric Procedure (Gastric bypass.)


Female  Surgical History: Reports: Hysterectomy


Neurological Surgical History: Reports: Laminectomy


Musculoskeletal Surgical History: Reports: Hip Replacement, Other (See Below) 

((B) carpal tunnel)


Other Musculoskeletal Surgeries/Procedures:: left hip replacement





- SUBSTANCE USE


Tobacco Use Status *Q: Never Tobacco User





- HOME MEDS


Home Medications: 


                                    Home Meds





Escitalopram [Lexapro] 20 mg PO DAILY 10/27/15 [History]


Cyanocobalamin (Vitamin B-12) [Vitamin B12] 2,500 mcg PO DAILY 07/16/17 

[History]


Ergocalciferol (Vitamin D2) [Vitamin D2] 2,000 units PO DAILY 07/16/17 [History]


Ferrous Sulfate 325 mg PO DAILY 07/16/17 [History]


Omeprazole Magnesium [Prilosec Otc] 20 mg PO DAILY 07/16/17 [History]


Calcium Carbonate [Calcium] 2,000 mg PO DAILY 09/23/20 [History]


dexAMETHasone [Dexamethasone] 8 mg PO BID #10 tablet 09/26/20 [Rx]


Acetaminophen/HYDROcodone [Norco 325-5 MG] 1 - 2 tab PO Q6H PRN #30 tablet 

11/23/20 [Rx]


Aspirin [Aspirin EC] 325 mg PO BID #84 tab 11/23/20 [Rx]











- CURRENT (IN HOUSE) MEDS


Current Meds: 





                               Current Medications





Lactated Ringer's (Ringers, Lactated)  1,000 mls @ 125 mls/hr IV ASDIRECTED VIRGIL


   Stop: 11/23/20 23:00


Lidocaine/Sodium Bicarbonate (Buffered Lidocaine 1% In Ns 8.4%)  0.25 ml IDERM 

ONETIME PRN


   PRN Reason: Prior to IV Start


   Stop: 11/23/20 18:00


Sodium Chloride (Saline Flush)  10 ml FLUSH ASDIRECTED PRN


   PRN Reason: Keep Vein Open


   Stop: 11/23/20 18:00





Discontinued Medications





Fentanyl (Sublimaze) Confirm Administered Dose 100 mcg .ROUTE .STK-MED ONE


   Stop: 11/23/20 10:59


Lidocaine HCl (Xylocaine-Mpf 1%) Confirm Administered Dose 4 mls @ as directed 

.ROUTE .STK-MED ONE


   Stop: 11/23/20 10:58


Midazolam HCl (Versed 1 Mg/Ml) Confirm Administered Dose 2 mg .ROUTE .STK-MED 

ONE


   Stop: 11/23/20 10:58


Propofol (Diprivan  20 Ml) Confirm Administered Dose 200 mg .ROUTE .STK-MED ONE


   Stop: 11/23/20 10:58


Ropivacaine (Naropin 0.5%) Confirm Administered Dose 30 ml .ROUTE .STK-MED ONE


   Stop: 11/23/20 10:57

## 2020-11-23 NOTE — CR
PROCEDURE INFORMATION:

Exam: FL Fluoroscopy, Up to 1 Hour Physician Time; Radiologist Not Present For 
Fluoroscopy

Exam date and time: 11/23/2020 3:14 PM

Age: 65 years old

Clinical indication: Screening exam; Prior surgery; Surgery date: Post-operative
(0-2 days); Surgery type: Left prox first metatarsal osteotomy with roberto



TECHNIQUE:

Imaging protocol: Fluoroscopy , up to 1 hour physician or other qualified health
care professional time. This radiologist did not supervise this procedure. Exam 
supervised by facility personnel. Report for radiation dosage reporting and 
documentation only.



COMPARISON:

No relevant prior studies available.



RADIATION DOSE METRICS:

Fluoroscopy time (seconds): 5.5 seconds

Number of fluoro spot images: 4

FINDINGS:

Procedural imaging:

Postoperative changes of the 1st metatarsal phalangeal joint with plate and 
screws in place without evidence of complications.

Notes: Fluoroscopy supervised by facility personnel. See also separate procedure
report.



IMPRESSION:

Fluoroscopy dosage documentation. See also separate procedure notes.



Thank you for allowing us to participate in the care of your patient.

Dictated and Authenticated by: Amando Munoz DO

11/23/2020 3:49 PM Central Time ([place] & [place])

[facility]

## 2020-11-23 NOTE — PCM.PRNOTE
- Free Text/Narrative


Note: 





Requested by Dr. David Troy. 


Dx: Left Hallux valgus.


Surgical Procedure : Left proximal 1st metatarsal osteotomy with 

metatarsophalangeal fusion 


Preoperative Treatment: Lt ankle block





Patient received explanation about an ankle block, benefits and risks discussed,

consent signed. Patient in preoperative area, monitors on, oxygen 2L via nasal 

cannula, semi-Clark position, left foot supported by folded blankets a elevated

position. Landmarks located. Time out done. Left fool cleaned with Chloraprep 

stick and allowed to dry. 4 mg of Midazolam given in incremental doses.


Ultrasound guidance used to locate peripheral nerves.


1st Injection: Posterior tibial nerve has been identified posterior to medial 

malleolus and posterior tibial artery. 25G needle inserted under direct U/S 

vision , aspiration negative, 7 ml of local anesthetic injected around the 

nerve.


2nd injection:  Dorsalis pedis artery identified between extensor hallucis 

longus and extensor digitorum longus tendons using U/S view. 25G needle inserted

under direct U/S vision, out-of-plane approach , aspiration negative, 6 ml of 

local anesthetic injected around the artery, lateral spread observed around deep

peroneal nerve.


3rd injection: Superficial peroneal nerve identified with U/S at lateral mid-

calf above the fibular border above the crural fascia between extensor digitorum

longus muscle and peroneus longus muscle. 6 ml of local anesthetic injected 

around the nerve maintaining negative aspiration.


4th injection: Greater saphenous vein identified with U/S above medial 

malleolus. 6 ml of local anesthetic injected around the vessel  maintaining 

negative aspiration to cover saphenous nerve distribution.. Total of 25mls of 

0.5% Ropivacaine with 100 mcg of Clonidine used. 





Patient has tolerated the procedure well. No signs of local anesthetic systemic 

toxicity noted.





Time start: 12:19


Time end: 12:35

## 2020-12-06 NOTE — PCM.OPNOTE
- General Post-Op/Procedure Note


Date of Surgery/Procedure: 11/23/20


Operative Procedure(s): left foot first MTP joint fusion


Pre Op Diagnosis: left first metatarsal phalangeal joint arthritis


Post-Op Diagnosis: Same


Anesthesia Technique: MAC, Regional Block


Primary Surgeon: David Sutherland


Anesthesia Provider: Tigist Kimball


Assistant: Mona Lemus


EBL in mLs: 5


Complications: None


Condition: Good

## 2020-12-06 NOTE — OR
DATE OF OPERATION:  11/23/2020

 

SURGEON:  David Sutherland MD

 

OPERATION PERFORMED:  Left foot 1st MTP joint fusion.

 

PREOPERATIVE DIAGNOSIS:

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

 

POSTOPERATIVE DIAGNOSIS:

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

 

ANESTHESIA:

MAC with regional block.

 

ANESTHESIA PROVIDER:

Tigist Kimball CRNA.

 

ASSISTANT:

Mona Lemus PA-C

 

ESTIMATED BLOOD LOSS:

Less than 5 mL.

 

COMPLICATIONS:

None.

 

CONDITION:

Stable.

 

DESCRIPTION OF PROCEDURE:

The patient was identified in the preop holding area.  Proper site was marked

and identified by the surgeon.  The patient underwent an ankle block at that

time by Anesthesia.  The patient was taken back to the operating theater where

after adequate anesthesia, right lower extremity was sterilely prepped and

draped in the usual sterile fashion and a nonsterile tourniquet was applied.

The right side was exsanguinated.  Tourniquet was insufflated to 220 mmHg.

Standard dorsal incision over the 1st MTP joint was taken down to the tendon.

The tendon was retracted laterally and a capsulotomy was performed.  Takedown of

the medial and lateral collateral ligaments was done.  The patient was noted to

have significant large osteophytes.  The dorsal osteophyte was rongeured off at

this time.  A guide pin was then placed in the center of the metatarsophalangeal

joint for a step reamer for the Dav MTP joint fusion set.  I was able to

ream for a size 22 reamer on the concave and then the convex side on the 1st MTP

joint where another guide pin was then placed and the concave reamer was then

utilized.  It was found to have good bony bleeding bed.  Any excess spurs were

then removed at this time.  A 3-0 partially-threaded cannulated screw was placed

in antegrade fashion across the MTP joint fusion site making sure on C-arm

fluoroscopy during the entirety that it was roughly in 10 degrees of

dorsiflexion. It had good compression with a single 3-0 cannulated screw.  The

Dav T-plate locking plate was then placed dorsally over the 1st MTP joint

and 2 screws were placed both proximally and distally to the fusion site.  It

was found to have adequate purchase with all screws.  C-arm fluoroscopy showed

good positioning of the 1st MTP joint and the previous large spurs were all

removed.  At this time, adequate saline was irrigated through the wound.  It was

decided that I would not have to do the shelf osteotomy as we had fairly good

correction and a smooth border foot comparable to the contralateral side with

just the fusion.  So, at this time, 2-0 Vicryl was used subcutaneously and nylon

was used for closure of the skin.  The patient was placed in a sterile soft

dressing and a posterior slab splint and sent to PACU in stable condition.

 

DD:  12/06/2020 17:07:48

DT:  12/06/2020 17:36:40  DEVENDRA

Job #:  [id number]

## 2021-04-21 ENCOUNTER — HOSPITAL ENCOUNTER (EMERGENCY)
Dept: HOSPITAL 41 - JD.ED | Age: 66
Discharge: HOME | End: 2021-04-21
Payer: MEDICARE

## 2021-04-21 VITALS — HEART RATE: 76 BPM | SYSTOLIC BLOOD PRESSURE: 152 MMHG | DIASTOLIC BLOOD PRESSURE: 104 MMHG

## 2021-04-21 DIAGNOSIS — R10.31: Primary | ICD-10-CM

## 2021-04-21 DIAGNOSIS — K21.9: ICD-10-CM

## 2021-04-21 DIAGNOSIS — R11.0: ICD-10-CM

## 2021-04-21 DIAGNOSIS — R19.7: ICD-10-CM

## 2021-04-21 DIAGNOSIS — Z79.899: ICD-10-CM

## 2021-04-21 PROCEDURE — 99284 EMERGENCY DEPT VISIT MOD MDM: CPT

## 2021-04-21 PROCEDURE — 96376 TX/PRO/DX INJ SAME DRUG ADON: CPT

## 2021-04-21 PROCEDURE — 83735 ASSAY OF MAGNESIUM: CPT

## 2021-04-21 PROCEDURE — 96375 TX/PRO/DX INJ NEW DRUG ADDON: CPT

## 2021-04-21 PROCEDURE — 96374 THER/PROPH/DIAG INJ IV PUSH: CPT

## 2021-04-21 PROCEDURE — 85007 BL SMEAR W/DIFF WBC COUNT: CPT

## 2021-04-21 PROCEDURE — 74177 CT ABD & PELVIS W/CONTRAST: CPT

## 2021-04-21 PROCEDURE — 36415 COLL VENOUS BLD VENIPUNCTURE: CPT

## 2021-04-21 PROCEDURE — 80053 COMPREHEN METABOLIC PANEL: CPT

## 2021-04-21 PROCEDURE — 81001 URINALYSIS AUTO W/SCOPE: CPT

## 2021-04-21 PROCEDURE — 85027 COMPLETE CBC AUTOMATED: CPT

## 2021-04-21 RX ADMIN — DIATRIZOATE MEGLUMINE AND DIATRIZOATE SODIUM ONE: 660; 100 LIQUID ORAL; RECTAL at 21:17

## 2021-04-21 RX ADMIN — DIATRIZOATE MEGLUMINE AND DIATRIZOATE SODIUM ONE ML: 660; 100 LIQUID ORAL; RECTAL at 21:17

## 2021-04-21 NOTE — PCM.PREANE
Preanesthetic Assessment





- Procedure


Proposed Procedure: 





Laparoscopic Appendectomy





- Anesthesia/Transfusion/Family Hx


Anesthesia History: Prior Anesthesia Without Reaction


Family History of Anesthesia Reaction: No


Transfusion History: No Prior Transfusion(s)


Intubation History: Unknown





- Review of Systems


Pulmonary: No Symptoms (Covid vaccinatated: 2nd vaccine given 3 weeks ago./Covid

positive noted 9/26/2020/ History of JUD)


Gastrointestinal: No Symptoms (GERD, History of gastric bypass, Hiatal hernia), 

Abdominal Pain, Diarrhea, Nausea


Neurological: No Symptoms (History of lumbar radiculopathy)


Other: Reports: Depression





- Physical Assessment


NPO Status Date: 04/21/21


NPO Status Time: 17:30


Vital Signs: 





                                Last Vital Signs











Temp  36.2 C   04/21/21 19:00


 


Pulse  76   04/21/21 19:00


 


Resp  16   04/21/21 19:00


 


BP  152/104 H  04/21/21 19:00


 


Pulse Ox  100   04/21/21 19:00











Height: 1.6 m


Weight: 83.915 kg


ASA Class: 2E


Mental Status: Alert & Oriented x3





- Lab


Values: 





                             Laboratory Last Values











WBC  10.22 K/mm3 (3.98-10.04)  H  04/21/21  18:35    


 


RBC  4.42 M/mm3 (3.98-5.22)   04/21/21  18:35    


 


Hgb  13.1 gm/dl (11.2-15.7)   04/21/21  18:35    


 


Hct  41.6 % (34.1-44.9)   04/21/21  18:35    


 


MCV  94.1 fl (79.4-94.8)   04/21/21  18:35    


 


MCH  29.6 pg (25.6-32.2)   04/21/21  18:35    


 


MCHC  31.5 g/dl (32.2-35.5)  L  04/21/21  18:35    


 


RDW Std Deviation  45.7 fL (36.4-46.3)   04/21/21  18:35    


 


Plt Count  307 K/mm3 (182-369)   04/21/21  18:35    


 


MPV  11.0 fl (9.4-12.3)   04/21/21  18:35    


 


Neutrophils % (Manual)  50 % (40-60)   04/21/21  18:35    


 


Band Neutrophils %  0 % (0-10)   04/21/21  18:35    


 


Lymphocytes % (Manual)  40 % (20-40)   04/21/21  18:35    


 


Atypical Lymphs %  4 %  04/21/21  18:35    


 


Monocytes % (Manual)  6 % (2-10)   04/21/21  18:35    


 


Eosinophils % (Manual)  0 % (0.7-5.8)  L  04/21/21  18:35    


 


Basophils % (Manual)  0  (0.1-1.2)  L  04/21/21  18:35    


 


Platelet Estimate  Adequate   04/21/21  18:35    


 


RBC Morph Comment  Normal   04/21/21  18:35    


 


Sodium  138 mEq/L (136-145)   04/21/21  18:35    


 


Potassium  3.7 mEq/L (3.5-5.1)   04/21/21  18:35    


 


Chloride  100 mEq/L ()   04/21/21  18:35    


 


Carbon Dioxide  26 mEq/L (21-32)   04/21/21  18:35    


 


Anion Gap  15.7  (5-15)  H  04/21/21  18:35    


 


BUN  22 mg/dL (7-18)  H  04/21/21  18:35    


 


Creatinine  0.9 mg/dL (0.55-1.02)   04/21/21  18:35    


 


Est Cr Clr Drug Dosing  49.49 mL/min  04/21/21  18:35    


 


Estimated GFR (MDRD)  > 60 mL/min (>60)   04/21/21  18:35    


 


BUN/Creatinine Ratio  24.4  (14-18)  H  04/21/21  18:35    


 


Glucose  108 mg/dL ()   04/21/21  18:35    


 


Calcium  9.1 mg/dL (8.5-10.1)   04/21/21  18:35    


 


Magnesium  1.9 mg/dl (1.8-2.4)   04/21/21  18:35    


 


Total Bilirubin  0.4 mg/dL (0.2-1.0)   04/21/21  18:35    


 


AST  38 U/L (15-37)  H  04/21/21  18:35    


 


ALT  44 U/L (14-59)   04/21/21  18:35    


 


Alkaline Phosphatase  99 U/L ()   04/21/21  18:35    


 


Total Protein  7.1 g/dl (6.4-8.2)   04/21/21  18:35    


 


Albumin  3.8 g/dl (3.4-5.0)   04/21/21  18:35    


 


Globulin  3.3 gm/dL  04/21/21  18:35    


 


Albumin/Globulin Ratio  1.2  (1-2)   04/21/21  18:35    


 


Urine Color  Yellow  (Yellow)   04/21/21  19:52    


 


Urine Appearance  Clear  (Clear)   04/21/21  19:52    


 


Urine pH  6.0  (5.0-8.0)   04/21/21  19:52    


 


Ur Specific Gravity  1.025  (1.005-1.030)   04/21/21  19:52    


 


Urine Protein  Negative  (Negative)   04/21/21  19:52    


 


Urine Glucose (UA)  Negative  (Negative)   04/21/21  19:52    


 


Urine Ketones  Negative  (Negative)   04/21/21  19:52    


 


Urine Occult Blood  Negative  (Negative)   04/21/21  19:52    


 


Urine Nitrite  Negative  (Negative)   04/21/21  19:52    


 


Urine Bilirubin  Negative  (Negative)   04/21/21  19:52    


 


Urine Urobilinogen  0.2  (0.2-1.0)   04/21/21  19:52    


 


Ur Leukocyte Esterase  Negative  (Negative)   04/21/21  19:52    


 


Urine RBC  0-5 /hpf (0-5)   04/21/21  19:52    


 


Urine WBC  0-5 /hpf (0-5)   04/21/21  19:52    


 


Ur Squamous Epith Cells  0-5 /hpf (0-5)   04/21/21  19:52    


 


Urine Bacteria  Few /hpf (FEW)   04/21/21  19:52    


 


Urine Mucus  Not seen /hpf (FEW)   04/21/21  19:52    








Above labs reviewed and noted and within acceptable ranges to proceed with 

procedure.





- Imaging/EKG


Impressions: 





EKG: SR rate=54, left atrial hypertrophy





- Allergies


Allergies/Adverse Reactions: 


                                    Allergies











Allergy/AdvReac Type Severity Reaction Status Date / Time


 


No Known Allergies Allergy   Verified 04/21/21 19:04














- Anesthesia Plan


Pre-Op Medication Ordered: None





- Acknowledgements


Anesthesia Type Planned: General Anesthesia


Pt an Appropriate Candidate for the Planned Anesthesia: Yes


Alternatives and Risks of Anesthesia Discussed w Pt/Guardian: Yes


Pt/Guardian Understands and Agrees with Anesthesia Plan: Yes





PreAnesthesia Questionnaire


HEENT History: Reports: Impaired Vision


Other HEENT History: wears eyeglasses.


Cardiovascular History: Reports: None


Respiratory History: Reports: None


Other Respiratory History: seasonal allergies.


Gastrointestinal History: Reports: GERD, Hiatal Hernia, Other (See Below)


Genitourinary History: Reports: UTI, Recurrent


OB/GYN History: Reports: Pregnancy


Musculoskeletal History: Reports: Back Pain, Chronic


Other Musculoskeletal History: lower back pain, lumbar stenosis


Psychiatric History: Reports: Depression


Hematologic History: Reports: B12 Deficiency, Iron Deficiency





- Infectious Disease History


Infectious Disease History: Reports: Chicken Pox, Novel Coronavirus


Other Infectious Disease History: chicken pox as child.





- Past Surgical History


GI Surgical History: Reports: Bariatric Procedure


Female  Surgical History: Reports: Hysterectomy


Neurological Surgical History: Reports: Lumbar Spine


Musculoskeletal Surgical History: Reports: Hip Replacement, Other (See Below)


Other Musculoskeletal Surgeries/Procedures:: left hip replacement





- SUBSTANCE USE


Tobacco Use Status *Q: Never Tobacco User


Second Hand Smoke Exposure: No


Recreational Drug Use History: No





- HOME MEDS


Home Medications: 


                                    Home Meds





Escitalopram [Lexapro] 20 mg PO DAILY 10/27/15 [History]


Cyanocobalamin (Vitamin B-12) [Vitamin B12] 2,500 mcg PO DAILY 07/16/17 

[History]


Ergocalciferol (Vitamin D2) [Vitamin D2] 2,000 units PO DAILY 07/16/17 [History]


Ferrous Sulfate 325 mg PO DAILY 07/16/17 [History]


Omeprazole Magnesium [Prilosec Otc] 20 mg PO DAILY 07/16/17 [History]


Calcium Carbonate [Calcium] 2,000 mg PO DAILY 09/23/20 [History]











- CURRENT (IN HOUSE) MEDS


Current Meds: 





                               Current Medications





Sodium Chloride (Normal Saline)  1,000 mls @ 150 mls/hr IV ASDIRECTED VIRGIL


   Last Admin: 04/21/21 19:39 Dose:  150 mls/hr


   Documented by: 


Sodium Chloride (Sodium Chloride 0.9% 10 Ml Syringe)  10 ml FLUSH ASDIRECTED VIRGIL


   Last Admin: 04/21/21 21:18 Dose:  10 ml


   Documented by: 





Discontinued Medications





Diatrizoate Meglum/Diatrizoate Sod (Diatrizoate Meglumine/Diatrizoate Sodium 37%

 120 Ml Bottle)  120 ml PO ONETIME ONE


   Stop: 04/21/21 20:00


   Last Admin: 04/21/21 21:17 Dose:  90 ml


   Documented by: 


Hydromorphone HCl (Hydromorphone 1 Mg/Ml Syringe)  0.5 mg IVPUSH ONETIME STA


   Stop: 04/21/21 19:15


   Last Admin: 04/21/21 19:39 Dose:  0.5 mg


   Documented by: 


Hydromorphone HCl (Hydromorphone 0.5 Mg/0.5 Ml Syringe)  0.5 mg IVPUSH ONETIME 

ONE


   Stop: 04/21/21 20:09


   Last Admin: 04/21/21 20:39 Dose:  0.5 mg


   Documented by: 


Iopamidol (Iopamidol 612 Mg/Ml 100 Ml Bottle)  100 ml IVPUSH ONETIME ONE


   Stop: 04/21/21 20:00


   Last Admin: 04/21/21 21:17 Dose:  100 ml


   Documented by: 


Ondansetron HCl (Ondansetron 4 Mg/2 Ml Sdv)  4 mg IVPUSH ONETIME ONE


   Stop: 04/21/21 19:15


   Last Admin: 04/21/21 19:39 Dose:  4 mg


   Documented by:

## 2021-04-21 NOTE — EDM.PDOC
ED HPI GENERAL MEDICAL PROBLEM





- General


Chief Complaint: Abdominal Pain


Stated Complaint: ABDOMINAL PAIN


Time Seen by Provider: 04/21/21 19:03


Source of Information: Reports: Patient, Family ()


History Limitations: Reports: No Limitations





- History of Present Illness


INITIAL COMMENTS - FREE TEXT/NARRATIVE: 





Mrs. Lombardo is a very pleasant 66-year-old woman who now presents to the ED after 

developing right lower quadrant abdominal pain around 18:00 this evening.  She 

has difficulty describing the character of the pain, stating that it is crampy, 

but she is not really sure.  It is constant, and she has not identified any 

modifiers, although the pain was worse when she was asked to switch from a 

seated position on the side of the gurney to a semirecumbent position on the 

gurney.  She states that the pain came on relatively quickly, over the course of

only a couple of minutes.  She has associated nausea, although has not vomited. 

She has had slight diarrhea.  No urinary symptoms.  No flank pain.  No recent 

fever.  No prior similar symptoms.





The patient still has her appendix.  She did not take any over-the-counter or 

home remedies prior to coming to the ED.  She last ate around 17:30 this 

evening.





Here in the ED, the patient's initial BP is found to be elevated at 152/104, 

otherwise, she is hemodynamically stable, afebrile, saturating 100% on room air.





Prior to this evening, the patient denies having a recent fever, chills, sore 

throat, ear pain, nasal or sinus congestion, cough, dyspnea, chest pain, 

palpitations, nausea, vomiting, constipation, diarrhea, abdominal pain, urinary 

symptoms, recent weight gain or weight loss, recent bloody bowel movements or 

black bowel movements, recent joint aches, headaches, or rashes.  She states 

that she received her second COVID vaccination about 3 weeks ago.





I reviewed the PMHx/PSHx/SocHx, which was reviewed with the patient by the RN.








The patient's PCP is CHARLIE Weiner.


Her Orthopedic Surgeon is Dr. David Sutherland.








  ** Right Lower Abdomen


Pain Score (Numeric/FACES): 10





- Related Data


                                    Allergies











Allergy/AdvReac Type Severity Reaction Status Date / Time


 


No Known Allergies Allergy   Verified 04/21/21 19:04











Home Meds: 


                                    Home Meds





Escitalopram [Lexapro] 20 mg PO DAILY 10/27/15 [History]


Cyanocobalamin (Vitamin B-12) [Vitamin B12] 2,500 mcg PO DAILY 07/16/17 

[History]


Ergocalciferol (Vitamin D2) [Vitamin D2] 2,000 units PO DAILY 07/16/17 [History]


Ferrous Sulfate 325 mg PO DAILY 07/16/17 [History]


Omeprazole Magnesium [Prilosec Otc] 20 mg PO DAILY 07/16/17 [History]


Calcium Carbonate [Calcium] 2,000 mg PO DAILY 09/23/20 [History]











Past Medical History


HEENT History: Reports: Impaired Vision


Other HEENT History: wears eyeglasses.


Cardiovascular History: Reports: None


Respiratory History: Reports: None


Other Respiratory History: seasonal allergies.


Gastrointestinal History: Reports: GERD, Hiatal Hernia (Hiatal hernia post 

gastric bypass surgery.), Other (See Below)


Genitourinary History: Reports: UTI, Recurrent


OB/GYN History: Reports: Pregnancy


Musculoskeletal History: Reports: Back Pain, Chronic


Other Musculoskeletal History: lower back pain, lumbar stenosis


Psychiatric History: Reports: Depression


Hematologic History: Reports: B12 Deficiency, Iron Deficiency





- Infectious Disease History


Infectious Disease History: Reports: Chicken Pox


Other Infectious Disease History: chicken pox as child.





- Past Surgical History


GI Surgical History: Reports: Bariatric Procedure (Gastric bypass around 2007)


Female  Surgical History: Reports: Hysterectomy (complete)


Neurological Surgical History: Reports: Lumbar Spine (laminectomy)


Musculoskeletal Surgical History: Reports: Hip Replacement, Other (See Below) 

((B) carpal tunnel)


Other Musculoskeletal Surgeries/Procedures:: left hip replacement





Social & Family History





- Family History


Family Medical History: No Pertinent Family History





- Caffeine Use


Caffeine Use: Reports: Coffee





- Living Situation & Occupation


Living situation: Reports: 


Occupation: Retired





ED ROS GENERAL





- Review of Systems


Review Of Systems: Comprehensive ROS is negative, except as noted in HPI.





ED EXAM, GI/ABD





- Physical Exam


Exam: See Below


Exam Limited By: No Limitations


General Appearance: Alert, WD/WN, Mild Distress (appears uncomfortable)


Eyes: Bilateral: Normal Appearance, EOMI


Ears: Normal External Exam, Hearing Grossly Normal


Nose: Normal Inspection


Throat/Mouth: Normal Inspection, Normal Lips, Normal Voice, No Airway Compromise


Head: Atraumatic, Normocephalic


Neck: Normal Inspection, Full Range of Motion


Respiratory/Chest: No Respiratory Distress, Lungs Clear, Normal Breath Sounds, 

No Accessory Muscle Use


Cardiovascular: Normal Peripheral Pulses, Regular Rate, Rhythm, No Gallop, No 

JVD, No Murmur, No Rub


GI/Abdominal Exam: Soft, No Organomegaly, No Distention, No Abnormal Bruit, No 

Mass, Tender (Suprapubically and in the right lower quadrant only.  Nontender 

elsewhere, but Rovsing sign present with palpation elsewhere.), Abnormal Bowel 

Sounds (diminished)


Back Exam: Normal Inspection, Full Range of Motion.  No: CVA Tenderness (L), CVA

 Tenderness (R)


Extremities: Normal Inspection, Normal Range of Motion, Normal Capillary Refill


Neurological: Alert, Oriented, Normal Cognition, No Motor/Sensory Deficits


Psychiatric: Anxious


Skin Exam: Warm, Dry, Intact, Normal Color, No Rash





Course





- Vital Signs


Last Recorded V/S: 


                                Last Vital Signs











Temp  36.2 C   04/21/21 19:00


 


Pulse  76   04/21/21 19:00


 


Resp  16   04/21/21 19:00


 


BP  152/104 H  04/21/21 19:00


 


Pulse Ox  100   04/21/21 19:00














- Orders/Labs/Meds


Orders: 


                               Active Orders 24 hr











 Category Date Time Status


 


 Abdomen Pelvis w Cont [CT] Stat Exams  04/21/21 19:14 Taken


 


 Sodium Chloride 0.9% [Normal Saline] 1,000 ml Med  04/21/21 19:15 Active





 IV ASDIRECTED   


 


 Sodium Chloride 0.9% [Saline Flush] Med  04/21/21 20:00 Active





 10 ml FLUSH ASDIRECTED   








                                Medication Orders





Sodium Chloride (Normal Saline)  1,000 mls @ 150 mls/hr IV ASDIRECTED VIRGIL


   Last Admin: 04/21/21 19:39  Dose: 150 mls/hr


   Documented by: HERMBRIT


Sodium Chloride (Sodium Chloride 0.9% 10 Ml Syringe)  10 ml FLUSH ASDIRECTED VIRGIL


   Last Admin: 04/21/21 21:18  Dose: 10 ml


   Documented by: GLENDY








Labs: 


                                Laboratory Tests











  04/21/21 04/21/21 04/21/21 Range/Units





  18:35 18:35 19:52 


 


WBC  10.22 H    (3.98-10.04)  K/mm3


 


RBC  4.42    (3.98-5.22)  M/mm3


 


Hgb  13.1    (11.2-15.7)  gm/dl


 


Hct  41.6    (34.1-44.9)  %


 


MCV  94.1    (79.4-94.8)  fl


 


MCH  29.6    (25.6-32.2)  pg


 


MCHC  31.5 L    (32.2-35.5)  g/dl


 


RDW Std Deviation  45.7    (36.4-46.3)  fL


 


Plt Count  307    (182-369)  K/mm3


 


MPV  11.0    (9.4-12.3)  fl


 


Neutrophils % (Manual)  50    (40-60)  %


 


Band Neutrophils %  0    (0-10)  %


 


Lymphocytes % (Manual)  40    (20-40)  %


 


Atypical Lymphs %  4    %


 


Monocytes % (Manual)  6    (2-10)  %


 


Eosinophils % (Manual)  0 L    (0.7-5.8)  %


 


Basophils % (Manual)  0 L    (0.1-1.2)  


 


Platelet Estimate  Adequate    


 


RBC Morph Comment  Normal    


 


Sodium   138   (136-145)  mEq/L


 


Potassium   3.7   (3.5-5.1)  mEq/L


 


Chloride   100   ()  mEq/L


 


Carbon Dioxide   26   (21-32)  mEq/L


 


Anion Gap   15.7 H   (5-15)  


 


BUN   22 H   (7-18)  mg/dL


 


Creatinine   0.9   (0.55-1.02)  mg/dL


 


Est Cr Clr Drug Dosing   49.49   mL/min


 


Estimated GFR (MDRD)   > 60   (>60)  mL/min


 


BUN/Creatinine Ratio   24.4 H   (14-18)  


 


Glucose   108   ()  mg/dL


 


Calcium   9.1   (8.5-10.1)  mg/dL


 


Magnesium   1.9   (1.8-2.4)  mg/dl


 


Total Bilirubin   0.4   (0.2-1.0)  mg/dL


 


AST   38 H   (15-37)  U/L


 


ALT   44   (14-59)  U/L


 


Alkaline Phosphatase   99   ()  U/L


 


Total Protein   7.1   (6.4-8.2)  g/dl


 


Albumin   3.8   (3.4-5.0)  g/dl


 


Globulin   3.3   gm/dL


 


Albumin/Globulin Ratio   1.2   (1-2)  


 


Urine Color    Yellow  (Yellow)  


 


Urine Appearance    Clear  (Clear)  


 


Urine pH    6.0  (5.0-8.0)  


 


Ur Specific Gravity    1.025  (1.005-1.030)  


 


Urine Protein    Negative  (Negative)  


 


Urine Glucose (UA)    Negative  (Negative)  


 


Urine Ketones    Negative  (Negative)  


 


Urine Occult Blood    Negative  (Negative)  


 


Urine Nitrite    Negative  (Negative)  


 


Urine Bilirubin    Negative  (Negative)  


 


Urine Urobilinogen    0.2  (0.2-1.0)  


 


Ur Leukocyte Esterase    Negative  (Negative)  


 


Urine RBC    0-5  (0-5)  /hpf


 


Urine WBC    0-5  (0-5)  /hpf


 


Ur Squamous Epith Cells    0-5  (0-5)  /hpf


 


Urine Bacteria    Few  (FEW)  /hpf


 


Urine Mucus    Not seen  (FEW)  /hpf











Meds: 


Medications











Generic Name Dose Route Start Last Admin





  Trade Name Freq  PRN Reason Stop Dose Admin


 


Sodium Chloride  1,000 mls @ 150 mls/hr  04/21/21 19:15  04/21/21 19:39





  Normal Saline  IV   150 mls/hr





  ASDIRECTED VIRGIL   Administration


 


Sodium Chloride  10 ml  04/21/21 20:00  04/21/21 21:18





  Sodium Chloride 0.9% 10 Ml Syringe  FLUSH   10 ml





  ASDIRECTED VIRGIL   Administration














Discontinued Medications














Generic Name Dose Route Start Last Admin





  Trade Name Freq  PRN Reason Stop Dose Admin


 


Diatrizoate Meglum/Diatrizoate Sod  120 ml  04/21/21 19:59  04/21/21 21:17





  Diatrizoate Meglumine/Diatrizoate Sodium 37% 120 Ml Bottle  PO  04/21/21 20:00

 90 ml





  ONETIME ONE   Administration


 


Hydromorphone HCl  0.5 mg  04/21/21 19:14  04/21/21 19:39





  Hydromorphone 1 Mg/Ml Syringe  IVPUSH  04/21/21 19:15  0.5 mg





  ONETIME STA   Administration


 


Hydromorphone HCl  0.5 mg  04/21/21 20:08  04/21/21 20:39





  Hydromorphone 0.5 Mg/0.5 Ml Syringe  IVPUSH  04/21/21 20:09  0.5 mg





  ONETIME ONE   Administration


 


Iopamidol  100 ml  04/21/21 19:59  04/21/21 21:17





  Iopamidol 612 Mg/Ml 100 Ml Bottle  IVPUSH  04/21/21 20:00  100 ml





  ONETIME ONE   Administration


 


Ondansetron HCl  4 mg  04/21/21 19:14  04/21/21 19:39





  Ondansetron 4 Mg/2 Ml Sdv  IVPUSH  04/21/21 19:15  4 mg





  ONETIME ONE   Administration














- Re-Assessments/Exams


Free Text/Narrative Re-Assessment/Exam: 





04/21/21 19:16


As above, the patient developed relatively sudden-onset right lower quadrant 

pain just over an hour ago with nausea and slight diarrhea.  She is quite tender

in the suprapubic through right lower quadrant region, with Rovsing sign present

on palpation elsewhere on her abdomen.  Obturator and psoas signs are absent, 

but heel drop sign is present.  Despite the rapidity of the onset of her 

symptoms, her presentation is most consistent with appendicitis, therefore I 

have ordered a CT of the abdomen and pelvis with oral and IV contrast.  A 

ureterolith is possible, except that she never had flank pain, and she has no 

CVA tenderness to percussion.  I have also ordered blood work and a urinalysis -

the patient prefers by quick catheter.  In the meantime, the patient will be 

given some IV Dilaudid, IV Zofran, and IV fluid.








04/21/21 22:03


The patient's CBC is remarkable for slight leukocytosis of 10.22, but with 0% 

bandemia, and the remainder of her CBC being unremarkable.


Her CMP is remarkable for an anion gap slightly elevated at 15.7, but with a 

bicarbonate normal at 26.  Her BUN is slightly elevated at 22, with a Cr normal 

at 0.9.  Her AST is slightly elevated at 38, with an ALT normal at 49, and the 

remainder of her CMP being unremarkable.


Her magnesium level is within normal limits at 1.9.


Her urinalysis is unremarkable.





CT of the abdomen and pelvis with oral and IV contrast is read by Brayan as:


1.  Cholelithiasis without cholecystitis.


2.  Diverticulosis without diverticulitis.








04/21/21 22:28


Test results discussed with the patient and her .  As above, tonight's 

work-up is unremarkable, and does not explain the cause of her symptoms.  At 

present, she is very comfortable.  I will discharge her home.











Departure





- Departure


Time of Disposition: 22:29


Disposition: Home, Self-Care 01


Condition: Good


Clinical Impression: 


 Right lower quadrant abdominal pain of unknown etiology








- Discharge Information


*PRESCRIPTION DRUG MONITORING PROGRAM REVIEWED*: Not Applicable


*COPY OF PRESCRIPTION DRUG MONITORING REPORT IN PATIENT [facility]: Not Applicable


Referrals: 


Sara Covington PA-C [Primary Care Provider] - 


David Sutherland MD [Physician] - 


Forms:  ED Department Discharge


Additional Instructions: 


You were seen in the emergency room after developing severe lower right 

abdominal pain.





Work-up in the ER included several blood tests, a urinalysis, and a CT of your 

abdomen and pelvis with oral and IV contrast.





Your entire work-up was unremarkable, and does not explain the cause of your 

symptoms.  There was no sign of appendicitis.





You may resume your usual activities.  If your symptoms recur, we recommend that

 you follow-up with your PCP, CHARLIE Weiner.





If any other problems, please do not hesitate to return to the ER.





Sepsis Event Note (ED)





- Evaluation


Sepsis Screening Result: No Definite Risk





- Focused Exam


Vital Signs: 


                                   Vital Signs











  Temp Pulse Resp BP Pulse Ox


 


 04/21/21 19:00  36.2 C  76  16  152/104 H  100














- My Orders


Last 24 Hours: 


My Active Orders





04/21/21 19:14


Abdomen Pelvis w Cont [CT] Stat 





04/21/21 19:15


Sodium Chloride 0.9% [Normal Saline] 1,000 ml IV ASDIRECTED 





04/21/21 20:00


Sodium Chloride 0.9% [Saline Flush]   10 ml FLUSH ASDIRECTED 














- Assessment/Plan


Last 24 Hours: 


My Active Orders





04/21/21 19:14


Abdomen Pelvis w Cont [CT] Stat 





04/21/21 19:15


Sodium Chloride 0.9% [Normal Saline] 1,000 ml IV ASDIRECTED 





04/21/21 20:00


Sodium Chloride 0.9% [Saline Flush]   10 ml FLUSH ASDIRECTED

## 2021-04-22 NOTE — CT
CT abdomen and pelvis

 

Technique: Multiple axial sections were obtained from above the dome 

of the diaphragm inferiorly through the pubic symphysis.  Intravenous 

and oral contrast was utilized.  Delayed images were also obtained 

through the bladder.  Reconstructed coronal and sagittal images were 

obtained.

 

Comparison: No prior abdominal imaging is available.

 

Findings: Visualized lung bases show nothing acute.

 

Liver contains no focal parenchymal abnormality.  There is evidence of

 cholelithiasis.  Spleen is within normal limits.  Adrenal glands show

 no nodule.  Prior stomach surgery is noted.  Pancreas is within 

normal limits.  Kidneys show symmetric contrast enhancement without 

hydronephrosis or mass.  Small area of scarring is noted within the 

upper right kidney.

 

Abdominal aorta shows no aneurysm.  No retroperitoneal adenopathy or 

mesenteric abnormalities are seen.  Appendix is seen which is normal. 

 No pelvic mass or adenopathy is appreciated.  No free fluid or 

inflammatory change is noted.  Diverticuli are seen within the sigmoid

 colon without evidence of diverticulitis.  Left hip prosthesis is 

seen causing mild artifact.

 

Delayed images show contrast within the bladder.

 

Bone window settings were reviewed which show diffuse disc space 

narrowing throughout the spine with anterior endplate osteophytes.  

There is unilateral spondylitic defects noted at L4-5.  Unilateral 

spondylitic defect is also noted at L5-S1 with spondylolisthesis 

measuring 8 mm.

 

Impression:

1.  Mild cholelithiasis without CT evidence of cholecystitis.

2.  Diverticulosis with no findings of diverticulitis within the 

sigmoid colon.

3.  Other findings believed to be chronic and incidental as noted 

above.

 

Diagnostic code #2

 

I agree with preliminary report from [facility], finalized on 04/21/21, 

10:47 PM CDT

## 2022-05-19 ENCOUNTER — HOSPITAL ENCOUNTER (OUTPATIENT)
Dept: HOSPITAL 41 - JD.SDS | Age: 67
Discharge: HOME | End: 2022-05-19
Attending: OPHTHALMOLOGY
Payer: MEDICARE

## 2022-05-19 VITALS — SYSTOLIC BLOOD PRESSURE: 134 MMHG | DIASTOLIC BLOOD PRESSURE: 74 MMHG | HEART RATE: 58 BPM

## 2022-05-19 DIAGNOSIS — H16.223: ICD-10-CM

## 2022-05-19 DIAGNOSIS — Z98.890: ICD-10-CM

## 2022-05-19 DIAGNOSIS — H02.834: ICD-10-CM

## 2022-05-19 DIAGNOSIS — H25.813: Primary | ICD-10-CM

## 2022-05-19 DIAGNOSIS — H43.813: ICD-10-CM

## 2022-05-19 DIAGNOSIS — H02.831: ICD-10-CM

## 2022-05-19 DIAGNOSIS — I10: ICD-10-CM

## 2022-05-19 PROCEDURE — 66984 XCAPSL CTRC RMVL W/O ECP: CPT

## 2022-05-19 PROCEDURE — C1780 LENS, INTRAOCULAR (NEW TECH): HCPCS

## 2022-05-19 RX ADMIN — TETRACAINE HYDROCHLORIDE SCH DROP: 5 SOLUTION OPHTHALMIC at 13:46

## 2022-05-19 RX ADMIN — TETRACAINE HYDROCHLORIDE SCH DROP: 5 SOLUTION OPHTHALMIC at 13:41

## 2022-05-19 RX ADMIN — TETRACAINE HYDROCHLORIDE SCH DROP: 5 SOLUTION OPHTHALMIC at 13:38

## 2022-05-19 RX ADMIN — TETRACAINE HYDROCHLORIDE SCH ML: 5 SOLUTION OPHTHALMIC at 14:03

## 2022-05-19 RX ADMIN — POLYMYXIN B SULFATE AND TRIMETHOPRIM SULFATE SCH DROP: 10000; 1 SOLUTION/ DROPS OPHTHALMIC at 13:00

## 2022-05-19 RX ADMIN — POLYMYXIN B SULFATE AND TRIMETHOPRIM SULFATE SCH ML: 10000; 1 SOLUTION/ DROPS OPHTHALMIC at 14:34

## 2022-05-19 RX ADMIN — POLYMYXIN B SULFATE AND TRIMETHOPRIM SULFATE SCH DROP: 10000; 1 SOLUTION/ DROPS OPHTHALMIC at 12:12

## 2022-06-23 ENCOUNTER — HOSPITAL ENCOUNTER (OUTPATIENT)
Dept: HOSPITAL 41 - JD.SDS | Age: 67
Discharge: HOME | End: 2022-06-23
Attending: OPHTHALMOLOGY
Payer: MEDICARE

## 2022-06-23 VITALS — HEART RATE: 68 BPM | DIASTOLIC BLOOD PRESSURE: 78 MMHG | SYSTOLIC BLOOD PRESSURE: 121 MMHG

## 2022-06-23 DIAGNOSIS — D23.111: ICD-10-CM

## 2022-06-23 DIAGNOSIS — H25.811: Primary | ICD-10-CM

## 2022-06-23 DIAGNOSIS — I10: ICD-10-CM

## 2022-06-23 DIAGNOSIS — D48.5: ICD-10-CM

## 2022-06-23 PROCEDURE — 66984 XCAPSL CTRC RMVL W/O ECP: CPT

## 2022-06-23 PROCEDURE — C1780 LENS, INTRAOCULAR (NEW TECH): HCPCS

## 2022-06-23 RX ADMIN — POLYMYXIN B SULFATE AND TRIMETHOPRIM SULFATE SCH DROP: 10000; 1 SOLUTION/ DROPS OPHTHALMIC at 15:36

## 2022-06-23 RX ADMIN — TETRACAINE HYDROCHLORIDE SCH DROP: 5 SOLUTION OPHTHALMIC at 16:06

## 2022-06-23 RX ADMIN — POLYMYXIN B SULFATE AND TRIMETHOPRIM SULFATE SCH ML: 10000; 1 SOLUTION/ DROPS OPHTHALMIC at 16:40

## 2022-06-23 RX ADMIN — TETRACAINE HYDROCHLORIDE SCH ML: 5 SOLUTION OPHTHALMIC at 16:26

## 2022-06-23 RX ADMIN — POLYMYXIN B SULFATE AND TRIMETHOPRIM SULFATE SCH DROP: 10000; 1 SOLUTION/ DROPS OPHTHALMIC at 14:56

## 2022-06-23 RX ADMIN — TETRACAINE HYDROCHLORIDE SCH DROP: 5 SOLUTION OPHTHALMIC at 16:07

## 2022-06-23 RX ADMIN — TETRACAINE HYDROCHLORIDE SCH DROP: 5 SOLUTION OPHTHALMIC at 16:05
